# Patient Record
Sex: FEMALE | Race: WHITE | Employment: OTHER | URBAN - METROPOLITAN AREA
[De-identification: names, ages, dates, MRNs, and addresses within clinical notes are randomized per-mention and may not be internally consistent; named-entity substitution may affect disease eponyms.]

---

## 2018-08-07 ENCOUNTER — OFFICE VISIT (OUTPATIENT)
Dept: INTERNAL MEDICINE CLINIC | Age: 68
End: 2018-08-07

## 2018-08-07 VITALS
HEIGHT: 65 IN | WEIGHT: 161 LBS | DIASTOLIC BLOOD PRESSURE: 78 MMHG | BODY MASS INDEX: 26.82 KG/M2 | SYSTOLIC BLOOD PRESSURE: 132 MMHG | HEART RATE: 76 BPM

## 2018-08-07 DIAGNOSIS — Z78.0 POSTMENOPAUSAL: ICD-10-CM

## 2018-08-07 DIAGNOSIS — M54.2 CHRONIC NECK PAIN: ICD-10-CM

## 2018-08-07 DIAGNOSIS — F32.5 MAJOR DEPRESSIVE DISORDER WITH SINGLE EPISODE, IN FULL REMISSION (HCC): ICD-10-CM

## 2018-08-07 DIAGNOSIS — Z80.3 FH: BREAST CANCER IN FIRST DEGREE RELATIVE: ICD-10-CM

## 2018-08-07 DIAGNOSIS — G89.29 CHRONIC NECK PAIN: ICD-10-CM

## 2018-08-07 DIAGNOSIS — Z12.11 COLON CANCER SCREENING: ICD-10-CM

## 2018-08-07 DIAGNOSIS — I10 ESSENTIAL HYPERTENSION: Primary | ICD-10-CM

## 2018-08-07 PROCEDURE — 99204 OFFICE O/P NEW MOD 45 MIN: CPT | Performed by: INTERNAL MEDICINE

## 2018-08-07 RX ORDER — OMEGA-3 FATTY ACIDS CAP DELAYED RELEASE 1000 MG 1000 MG
2000 CAPSULE DELAYED RELEASE ORAL
COMMUNITY

## 2018-08-07 RX ORDER — ACETAMINOPHEN 160 MG
TABLET,DISINTEGRATING ORAL
COMMUNITY

## 2018-08-07 RX ORDER — LISINOPRIL AND HYDROCHLOROTHIAZIDE 12.5; 1 MG/1; MG/1
1 TABLET ORAL DAILY
COMMUNITY
End: 2018-10-01 | Stop reason: SDUPTHER

## 2018-08-07 RX ORDER — VITAMIN E 268 MG
400 CAPSULE ORAL DAILY
COMMUNITY

## 2018-08-07 RX ORDER — CITALOPRAM 20 MG/1
30 TABLET ORAL DAILY
COMMUNITY
End: 2018-10-01 | Stop reason: SDUPTHER

## 2018-08-07 RX ORDER — UBIDECARENONE 60 MG
CAPSULE ORAL
COMMUNITY

## 2018-08-07 RX ORDER — POTASSIUM CHLORIDE 1.5 G/1.77G
20 POWDER, FOR SOLUTION ORAL DAILY
COMMUNITY

## 2018-08-07 ASSESSMENT — PATIENT HEALTH QUESTIONNAIRE - PHQ9
SUM OF ALL RESPONSES TO PHQ9 QUESTIONS 1 & 2: 0
2. FEELING DOWN, DEPRESSED OR HOPELESS: 0
1. LITTLE INTEREST OR PLEASURE IN DOING THINGS: 0
SUM OF ALL RESPONSES TO PHQ QUESTIONS 1-9: 0

## 2018-08-07 NOTE — PROGRESS NOTES
lisinopril-hydrochlorothiazide (PRINZIDE;ZESTORETIC) 10-12.5 MG per tablet Take 1 tablet by mouth daily      citalopram (CELEXA) 20 MG tablet Take 30 mg by mouth daily      aspirin 81 MG tablet Take 81 mg by mouth daily      Omega-3 Fatty Acids (FISH OIL) 1000 MG CPDR Take 2,000 mg by mouth      vitamin E 400 UNIT capsule Take 400 Units by mouth daily      Cholecalciferol (VITAMIN D3) 2000 units CAPS Take by mouth      calcium acetate (PHOSLO) 667 MG capsule Take by mouth 3 times daily (with meals)      Coenzyme Q10 (CO Q 10) 60 MG CAPS Take by mouth      potassium chloride (KLOR-CON) 20 MEQ packet Take 20 mEq by mouth 2 times daily         Past Medical History:   Diagnosis Date    Hypertension     Major depression      Past Surgical History:   Procedure Laterality Date     SECTION       Family History   Problem Relation Age of Onset    Breast Cancer Mother     Arrhythmia Mother         Pacemaker    Heart Failure Father     Breast Cancer Sister      Social History   Substance Use Topics    Smoking status: Never Smoker    Smokeless tobacco: Never Used    Alcohol use 2.4 oz/week     4 Glasses of wine per week     History   Sexual Activity    Sexual activity: Not on file          HPI  Hypertension:  Home blood pressure monitoring: Yes - 120-130s/80s. She is adherent to a low sodium diet. Patient denies chest pain, shortness of breath, lightheadedness and palpitations. Antihypertensive medication side effects: no medication side effects noted. Use of agents associated with hypertension: none. Mood Disorder:  Patient presents for follow-up of depression. Current complaints include: none. She denies anhedonia, depressed mood, tearfulness, feelings of hopelessness, feelings of worthlessness/excessive guilt, insomnia, irritability, excessive worry and suicidal thoughts or behavior. Symptoms/signs of cj: none. External stressors: nothing new.

## 2018-09-29 ENCOUNTER — TELEPHONE (OUTPATIENT)
Dept: INTERNAL MEDICINE CLINIC | Age: 68
End: 2018-09-29

## 2018-10-01 ENCOUNTER — TELEPHONE (OUTPATIENT)
Dept: INTERNAL MEDICINE CLINIC | Age: 68
End: 2018-10-01

## 2018-10-01 RX ORDER — CITALOPRAM 20 MG/1
30 TABLET ORAL DAILY
Qty: 30 TABLET | Refills: 5 | Status: SHIPPED | OUTPATIENT
Start: 2018-10-01 | End: 2018-10-29 | Stop reason: SDUPTHER

## 2018-10-01 RX ORDER — LISINOPRIL AND HYDROCHLOROTHIAZIDE 12.5; 1 MG/1; MG/1
1 TABLET ORAL DAILY
Qty: 30 TABLET | Refills: 5 | Status: SHIPPED | OUTPATIENT
Start: 2018-10-01 | End: 2018-10-29 | Stop reason: SDUPTHER

## 2018-10-06 LAB — FECAL BLOOD IMMUNOCHEMICAL TEST: NEGATIVE

## 2018-10-09 DIAGNOSIS — I10 ESSENTIAL HYPERTENSION: ICD-10-CM

## 2018-10-09 LAB
A/G RATIO: 2 (ref 1.1–2.2)
ALBUMIN SERPL-MCNC: 4.4 G/DL (ref 3.4–5)
ALP BLD-CCNC: 60 U/L (ref 40–129)
ALT SERPL-CCNC: 17 U/L (ref 10–40)
ANION GAP SERPL CALCULATED.3IONS-SCNC: 16 MMOL/L (ref 3–16)
AST SERPL-CCNC: 20 U/L (ref 15–37)
BILIRUB SERPL-MCNC: <0.2 MG/DL (ref 0–1)
BUN BLDV-MCNC: 20 MG/DL (ref 7–20)
CALCIUM SERPL-MCNC: 9.6 MG/DL (ref 8.3–10.6)
CHLORIDE BLD-SCNC: 99 MMOL/L (ref 99–110)
CHOLESTEROL, FASTING: 187 MG/DL (ref 0–199)
CO2: 25 MMOL/L (ref 21–32)
CREAT SERPL-MCNC: 0.6 MG/DL (ref 0.6–1.2)
GFR AFRICAN AMERICAN: >60
GFR NON-AFRICAN AMERICAN: >60
GLOBULIN: 2.2 G/DL
GLUCOSE FASTING: 99 MG/DL (ref 70–99)
HDLC SERPL-MCNC: 68 MG/DL (ref 40–60)
LDL CHOLESTEROL CALCULATED: 93 MG/DL
POTASSIUM SERPL-SCNC: 4.5 MMOL/L (ref 3.5–5.1)
SODIUM BLD-SCNC: 140 MMOL/L (ref 136–145)
TOTAL PROTEIN: 6.6 G/DL (ref 6.4–8.2)
TRIGLYCERIDE, FASTING: 131 MG/DL (ref 0–150)
TSH REFLEX: 2.01 UIU/ML (ref 0.27–4.2)
VLDLC SERPL CALC-MCNC: 26 MG/DL

## 2018-10-29 ENCOUNTER — TELEPHONE (OUTPATIENT)
Dept: INTERNAL MEDICINE CLINIC | Age: 68
End: 2018-10-29

## 2018-10-29 RX ORDER — LISINOPRIL AND HYDROCHLOROTHIAZIDE 12.5; 1 MG/1; MG/1
1 TABLET ORAL DAILY
Qty: 90 TABLET | Refills: 1 | Status: SHIPPED | OUTPATIENT
Start: 2018-10-29 | End: 2019-06-06 | Stop reason: SDUPTHER

## 2018-10-29 RX ORDER — CITALOPRAM 20 MG/1
30 TABLET ORAL DAILY
Qty: 135 TABLET | Refills: 1 | Status: SHIPPED | OUTPATIENT
Start: 2018-10-29 | End: 2019-05-06 | Stop reason: SDUPTHER

## 2018-12-10 ENCOUNTER — TELEPHONE (OUTPATIENT)
Dept: INTERNAL MEDICINE CLINIC | Age: 68
End: 2018-12-10

## 2018-12-10 NOTE — TELEPHONE ENCOUNTER
You can provide her with an external derm referral since our the wait time for our dermatologists would be too long. Dermatologists:    Yareli Long.  MD Sri Antoine)  [address] [place] [address], [address] [place] [address]    Dr Eddy Call           [phone]   [facility]    Dr Jeffrey Samano        [phone]  [facility]

## 2019-05-06 ENCOUNTER — TELEPHONE (OUTPATIENT)
Dept: INTERNAL MEDICINE CLINIC | Age: 69
End: 2019-05-06

## 2019-05-06 RX ORDER — CITALOPRAM 20 MG/1
30 TABLET ORAL DAILY
Qty: 135 TABLET | Refills: 0 | Status: SHIPPED | OUTPATIENT
Start: 2019-05-06 | End: 2019-06-06 | Stop reason: SDUPTHER

## 2019-05-06 NOTE — TELEPHONE ENCOUNTER
Patient is requesting the following prescription(s):  citalopram (CELEXA) 20 MG tablet Take 1.5 tablets by mouth daily     [address] [facility] [phone]     Aware doctor  is out of the office today    Patient made aware to allow 24 to 48 business hours for prescription refills. Patient states she only has enough to last through tomorrow.

## 2019-06-06 ENCOUNTER — OFFICE VISIT (OUTPATIENT)
Dept: INTERNAL MEDICINE CLINIC | Age: 69
End: 2019-06-06
Payer: MEDICARE

## 2019-06-06 VITALS
WEIGHT: 166 LBS | SYSTOLIC BLOOD PRESSURE: 130 MMHG | HEART RATE: 74 BPM | OXYGEN SATURATION: 98 % | DIASTOLIC BLOOD PRESSURE: 78 MMHG | BODY MASS INDEX: 27.62 KG/M2

## 2019-06-06 DIAGNOSIS — Z12.31 ENCOUNTER FOR SCREENING MAMMOGRAM FOR BREAST CANCER: ICD-10-CM

## 2019-06-06 DIAGNOSIS — G89.29 CHRONIC NECK PAIN: ICD-10-CM

## 2019-06-06 DIAGNOSIS — I10 ESSENTIAL HYPERTENSION: Primary | ICD-10-CM

## 2019-06-06 DIAGNOSIS — Z78.0 POST-MENOPAUSAL: ICD-10-CM

## 2019-06-06 DIAGNOSIS — F32.5 MAJOR DEPRESSIVE DISORDER WITH SINGLE EPISODE, IN FULL REMISSION (HCC): ICD-10-CM

## 2019-06-06 DIAGNOSIS — M54.2 CHRONIC NECK PAIN: ICD-10-CM

## 2019-06-06 DIAGNOSIS — Z12.11 SCREENING FOR COLON CANCER: Primary | ICD-10-CM

## 2019-06-06 PROCEDURE — 90471 IMMUNIZATION ADMIN: CPT | Performed by: INTERNAL MEDICINE

## 2019-06-06 PROCEDURE — 99214 OFFICE O/P EST MOD 30 MIN: CPT | Performed by: INTERNAL MEDICINE

## 2019-06-06 PROCEDURE — 90715 TDAP VACCINE 7 YRS/> IM: CPT | Performed by: INTERNAL MEDICINE

## 2019-06-06 RX ORDER — CITALOPRAM 20 MG/1
30 TABLET ORAL DAILY
Qty: 135 TABLET | Refills: 1 | Status: SHIPPED | OUTPATIENT
Start: 2019-06-06 | End: 2019-12-12 | Stop reason: SDUPTHER

## 2019-06-06 RX ORDER — LISINOPRIL AND HYDROCHLOROTHIAZIDE 12.5; 1 MG/1; MG/1
1 TABLET ORAL DAILY
Qty: 90 TABLET | Refills: 1 | Status: SHIPPED | OUTPATIENT
Start: 2019-06-06 | End: 2019-12-12 | Stop reason: SDUPTHER
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
## 2019-06-07 ENCOUNTER — TELEPHONE (OUTPATIENT)
Dept: INTERNAL MEDICINE CLINIC | Age: 69
End: 2019-06-07

## 2019-06-07 NOTE — TELEPHONE ENCOUNTER
Upon leaving from her appt yesterday patient saw on her AVS in top right under things discussed Major Depression Disorder/Single Episode. She does not know what constituted this except she talked about breaking up with long-term boyfriend but she said really not that bad. She would like that removed. Is that possible? Please call patient at number provided to let her know. Thanks.

## 2019-06-12 ENCOUNTER — HOSPITAL ENCOUNTER (OUTPATIENT)
Dept: MAMMOGRAPHY | Age: 69
Discharge: HOME | End: 2019-06-12
Payer: MEDICARE

## 2019-06-12 ENCOUNTER — HOSPITAL ENCOUNTER (OUTPATIENT)
Dept: ULTRASOUND IMAGING | Age: 69
Discharge: HOME | End: 2019-06-12
Payer: MEDICARE

## 2019-06-12 ENCOUNTER — HOSPITAL ENCOUNTER (OUTPATIENT)
Dept: GENERAL RADIOLOGY | Age: 69
Discharge: HOME | End: 2019-06-12
Payer: MEDICARE

## 2019-06-12 DIAGNOSIS — Z78.0 POST-MENOPAUSAL: ICD-10-CM

## 2019-06-12 DIAGNOSIS — Z78.0 POSTMENOPAUSAL: ICD-10-CM

## 2019-06-12 DIAGNOSIS — Z12.31 ENCOUNTER FOR SCREENING MAMMOGRAM FOR BREAST CANCER: ICD-10-CM

## 2019-06-12 DIAGNOSIS — R92.8 ABNORMAL MAMMOGRAM: ICD-10-CM

## 2019-06-12 PROBLEM — M85.89 OSTEOPENIA OF MULTIPLE SITES: Status: ACTIVE | Noted: 2019-06-12

## 2019-06-12 PROCEDURE — 76642 ULTRASOUND BREAST LIMITED: CPT

## 2019-06-12 PROCEDURE — 77067 SCR MAMMO BI INCL CAD: CPT

## 2019-06-12 PROCEDURE — 77080 DXA BONE DENSITY AXIAL: CPT

## 2019-06-12 PROCEDURE — G0279 TOMOSYNTHESIS, MAMMO: HCPCS

## 2019-06-21 ENCOUNTER — HOSPITAL ENCOUNTER (OUTPATIENT)
Dept: ULTRASOUND IMAGING | Age: 69
Discharge: HOME | End: 2019-06-21
Payer: MEDICARE

## 2019-06-21 ENCOUNTER — HOSPITAL ENCOUNTER (OUTPATIENT)
Dept: MAMMOGRAPHY | Age: 69
Discharge: HOME | End: 2019-06-21
Payer: MEDICARE

## 2019-06-21 DIAGNOSIS — R92.8 ABNORMAL MAMMOGRAM: ICD-10-CM

## 2019-06-21 DIAGNOSIS — R93.89 ABNORMAL ULTRASOUND: ICD-10-CM

## 2019-06-21 PROCEDURE — 77065 DX MAMMO INCL CAD UNI: CPT

## 2019-06-21 PROCEDURE — 88305 TISSUE EXAM BY PATHOLOGIST: CPT

## 2019-06-21 PROCEDURE — 2709999900 US BREAST BIOPSY W LOC DEVICE 1ST LESION LEFT

## 2019-12-12 ENCOUNTER — OFFICE VISIT (OUTPATIENT)
Dept: INTERNAL MEDICINE CLINIC | Age: 69
End: 2019-12-12
Payer: MEDICARE

## 2019-12-12 VITALS
SYSTOLIC BLOOD PRESSURE: 128 MMHG | DIASTOLIC BLOOD PRESSURE: 80 MMHG | OXYGEN SATURATION: 97 % | HEIGHT: 65 IN | BODY MASS INDEX: 28.66 KG/M2 | HEART RATE: 70 BPM | WEIGHT: 172 LBS

## 2019-12-12 DIAGNOSIS — G89.29 CHRONIC NECK PAIN: ICD-10-CM

## 2019-12-12 DIAGNOSIS — M54.2 CHRONIC NECK PAIN: ICD-10-CM

## 2019-12-12 DIAGNOSIS — F32.5 MAJOR DEPRESSIVE DISORDER WITH SINGLE EPISODE, IN FULL REMISSION (HCC): ICD-10-CM

## 2019-12-12 DIAGNOSIS — I10 ESSENTIAL HYPERTENSION: Primary | ICD-10-CM

## 2019-12-12 DIAGNOSIS — M85.89 OSTEOPENIA OF MULTIPLE SITES: ICD-10-CM

## 2019-12-12 PROCEDURE — 99214 OFFICE O/P EST MOD 30 MIN: CPT | Performed by: INTERNAL MEDICINE

## 2019-12-12 RX ORDER — LISINOPRIL AND HYDROCHLOROTHIAZIDE 12.5; 1 MG/1; MG/1
1 TABLET ORAL DAILY
Qty: 90 TABLET | Refills: 1 | Status: SHIPPED | OUTPATIENT
Start: 2019-12-12 | End: 2020-06-19

## 2019-12-12 RX ORDER — CITALOPRAM 20 MG/1
30 TABLET ORAL DAILY
Qty: 135 TABLET | Refills: 1 | Status: SHIPPED | OUTPATIENT
Start: 2019-12-12 | End: 2020-06-19

## 2020-06-02 ENCOUNTER — TELEPHONE (OUTPATIENT)
Dept: INTERNAL MEDICINE CLINIC | Age: 70
End: 2020-06-02

## 2020-06-19 RX ORDER — CITALOPRAM 20 MG/1
TABLET ORAL
Qty: 135 TABLET | Refills: 0 | Status: SHIPPED | OUTPATIENT
Start: 2020-06-19 | End: 2020-09-04 | Stop reason: SDUPTHER

## 2020-06-19 RX ORDER — LISINOPRIL AND HYDROCHLOROTHIAZIDE 12.5; 1 MG/1; MG/1
TABLET ORAL
Qty: 90 TABLET | Refills: 0 | Status: SHIPPED | OUTPATIENT
Start: 2020-06-19 | End: 2020-09-04 | Stop reason: SDUPTHER

## 2020-07-14 ENCOUNTER — TELEPHONE (OUTPATIENT)
Dept: INTERNAL MEDICINE CLINIC | Age: 70
End: 2020-07-14

## 2020-09-04 ENCOUNTER — OFFICE VISIT (OUTPATIENT)
Dept: INTERNAL MEDICINE CLINIC | Age: 70
End: 2020-09-04
Payer: MEDICARE

## 2020-09-04 VITALS
TEMPERATURE: 96.9 F | WEIGHT: 173 LBS | DIASTOLIC BLOOD PRESSURE: 68 MMHG | HEART RATE: 61 BPM | OXYGEN SATURATION: 98 % | SYSTOLIC BLOOD PRESSURE: 114 MMHG | BODY MASS INDEX: 28.57 KG/M2

## 2020-09-04 PROCEDURE — 99214 OFFICE O/P EST MOD 30 MIN: CPT | Performed by: INTERNAL MEDICINE

## 2020-09-04 RX ORDER — CITALOPRAM 20 MG/1
TABLET ORAL
Qty: 135 TABLET | Refills: 1 | Status: SHIPPED | OUTPATIENT
Start: 2020-09-04 | End: 2021-03-16 | Stop reason: SDUPTHER

## 2020-09-04 RX ORDER — LISINOPRIL AND HYDROCHLOROTHIAZIDE 12.5; 1 MG/1; MG/1
TABLET ORAL
Qty: 90 TABLET | Refills: 1 | Status: SHIPPED | OUTPATIENT
Start: 2020-09-04 | End: 2021-03-16 | Stop reason: SDUPTHER

## 2020-09-04 NOTE — PROGRESS NOTES
Sig Taking? Authorizing Provider   lisinopril-hydroCHLOROthiazide (PRINZIDE;ZESTORETIC) 10-12.5 MG per tablet TAKE ONE TABLET BY MOUTH DAILY Yes Alee Amin MD   citalopram (CELEXA) 20 MG tablet TAKE ONE AND ONE-HALF TABLET BY MOUTH DAILY Yes Alee Amin MD   BIOTIN PO Take by mouth Yes Historical Provider, MD   aspirin 81 MG tablet Take 81 mg by mouth daily Yes Historical Provider, MD   Omega-3 Fatty Acids (FISH OIL) 1000 MG CPDR Take 2,000 mg by mouth Yes Historical Provider, MD   vitamin E 400 UNIT capsule Take 400 Units by mouth daily Yes Historical Provider, MD   Cholecalciferol (VITAMIN D3) 2000 units CAPS Take by mouth Yes Historical Provider, MD   Coenzyme Q10 (CO Q 10) 60 MG CAPS Take by mouth Yes Historical Provider, MD   potassium chloride (KLOR-CON) 20 MEQ packet Take 20 mEq by mouth daily  Yes Historical Provider, MD   calcium acetate (PHOSLO) 667 MG capsule Take by mouth 3 times daily (with meals)  Historical Provider, MD       Vitals:    09/04/20 0936   BP: 114/68   Pulse: 61   Temp: 96.9 °F (36.1 °C)   TempSrc: Infrared   SpO2: 98%   Weight: 173 lb (78.5 kg)      Body mass index is 28.57 kg/m². Wt Readings from Last 3 Encounters:   09/04/20 173 lb (78.5 kg)   12/12/19 172 lb (78 kg)   06/06/19 166 lb (75.3 kg)     BP Readings from Last 3 Encounters:   09/04/20 114/68   12/12/19 128/80   06/06/19 130/78       Physical Exam  Constitutional:       General: She is not in acute distress. Appearance: She is well-developed. Eyes:      Conjunctiva/sclera: Conjunctivae normal.   Neck:      Musculoskeletal: No spinous process tenderness or muscular tenderness. Thyroid: No thyroid mass or thyromegaly. Cardiovascular:      Rate and Rhythm: Normal rate and regular rhythm. Heart sounds: Normal heart sounds. No murmur. No friction rub. No gallop. Pulmonary:      Effort: Pulmonary effort is normal. No respiratory distress. Breath sounds: Normal breath sounds.  No wheezing, rhonchi or rales. Lymphadenopathy:      Cervical: No cervical adenopathy. Skin:     General: Skin is warm and dry. Findings: No erythema or rash. Neurological:      Mental Status: She is alert and oriented to person, place, and time. Psychiatric:         Speech: Speech normal.         Behavior: Behavior normal.         Thought Content:  Thought content normal.         Judgment: Judgment normal.         Lab Results   Component Value Date    CHOLFAST 187 10/09/2018    TRIGLYCFAST 131 10/09/2018    HDL 68 (H) 10/09/2018    LDLCALC 93 10/09/2018     Lab Results   Component Value Date    GLUF 97 12/12/2019     Lab Results   Component Value Date     12/12/2019    K 4.1 12/12/2019    BUN 16 12/12/2019    CREATININE 0.5 (L) 12/12/2019    LABGLOM >60 12/12/2019    GFRAA >60 12/12/2019    CALCIUM 9.6 12/12/2019    VITD25 38.0 12/12/2019     Lab Results   Component Value Date    ALT 19 12/12/2019    AST 21 12/12/2019     No results found for: HGB  Lab Results   Component Value Date    TSHREFLEX 2.01 10/09/2018

## 2020-10-20 ENCOUNTER — TELEPHONE (OUTPATIENT)
Dept: INTERNAL MEDICINE CLINIC | Age: 70
End: 2020-10-20

## 2021-03-16 ENCOUNTER — VIRTUAL VISIT (OUTPATIENT)
Dept: INTERNAL MEDICINE CLINIC | Age: 71
End: 2021-03-16
Payer: MEDICARE

## 2021-03-16 DIAGNOSIS — H91.90 HEARING LOSS, UNSPECIFIED HEARING LOSS TYPE, UNSPECIFIED LATERALITY: ICD-10-CM

## 2021-03-16 DIAGNOSIS — Z00.00 ROUTINE GENERAL MEDICAL EXAMINATION AT A HEALTH CARE FACILITY: Primary | ICD-10-CM

## 2021-03-16 DIAGNOSIS — Z11.59 NEED FOR HEPATITIS C SCREENING TEST: ICD-10-CM

## 2021-03-16 PROCEDURE — G0438 PPPS, INITIAL VISIT: HCPCS | Performed by: INTERNAL MEDICINE

## 2021-03-16 RX ORDER — CITALOPRAM 20 MG/1
TABLET ORAL
Qty: 135 TABLET | Refills: 1 | Status: SHIPPED | OUTPATIENT
Start: 2021-03-16 | End: 2021-09-28 | Stop reason: SDUPTHER

## 2021-03-16 RX ORDER — LISINOPRIL AND HYDROCHLOROTHIAZIDE 12.5; 1 MG/1; MG/1
TABLET ORAL
Qty: 90 TABLET | Refills: 1 | Status: SHIPPED | OUTPATIENT
Start: 2021-03-16 | End: 2021-09-28 | Stop reason: SDUPTHER

## 2021-03-16 ASSESSMENT — PATIENT HEALTH QUESTIONNAIRE - PHQ9
2. FEELING DOWN, DEPRESSED OR HOPELESS: 1
SUM OF ALL RESPONSES TO PHQ9 QUESTIONS 1 & 2: 2

## 2021-03-16 ASSESSMENT — LIFESTYLE VARIABLES
HAVE YOU OR SOMEONE ELSE BEEN INJURED AS A RESULT OF YOUR DRINKING: 0
HAS A RELATIVE, FRIEND, DOCTOR, OR ANOTHER HEALTH PROFESSIONAL EXPRESSED CONCERN ABOUT YOUR DRINKING OR SUGGESTED YOU CUT DOWN: 0
AUDIT-C TOTAL SCORE: 3
HOW OFTEN DURING THE LAST YEAR HAVE YOU BEEN UNABLE TO REMEMBER WHAT HAPPENED THE NIGHT BEFORE BECAUSE YOU HAD BEEN DRINKING: 0
HOW OFTEN DURING THE LAST YEAR HAVE YOU HAD A FEELING OF GUILT OR REMORSE AFTER DRINKING: 0
HOW OFTEN DURING THE LAST YEAR HAVE YOU FOUND THAT YOU WERE NOT ABLE TO STOP DRINKING ONCE YOU HAD STARTED: 0

## 2021-03-16 NOTE — PROGRESS NOTES
Cancer Mother     Arrhythmia Mother         Pacemaker    Heart Failure Father     Breast Cancer Sister        CareTeam (Including outside providers/suppliers regularly involved in providing care):   Patient Care Team:  Eden Enriquez MD as PCP - General (Internal Medicine)  Eden Enriquez MD as PCP - REHABILITATION [facility] Empaneled Provider    Wt Readings from Last 3 Encounters:   09/04/20 173 lb (78.5 kg)   12/12/19 172 lb (78 kg)   06/06/19 166 lb (75.3 kg)     No flowsheet data found. There is no height or weight on file to calculate BMI. Based upon direct observation of the patient, evaluation of cognition reveals recent and remote memory intact. Physical Exam  Constitutional:       General: She is not in acute distress. Appearance: She is well-developed. HENT:      Head: Normocephalic and atraumatic. Mouth/Throat:      Lips: No lesions. Neck:      Comments: No visible mass  Pulmonary:      Effort: Pulmonary effort is normal. No respiratory distress. Skin:     Comments: No rash, erythema or other discoloration on facial skin and exposed areas of neck and upper extremites    Neurological:      Mental Status: She is alert. Comments: No facial asymmetry (cranial nerve 7 motor function) or gaze palsy   Psychiatric:         Attention and Perception: Attention normal.         Mood and Affect: Mood normal.         Speech: Speech normal.         Behavior: Behavior normal.         Thought Content: Thought content normal.         Cognition and Memory: Cognition normal.       Patient's complete Health Risk Assessment and screening values have been reviewed and are found in Flowsheets. The following problems were reviewed today and where indicated follow up appointments were made and/or referrals ordered.     Positive Risk Factor Screenings with Interventions:            General Health and ACP:  General  In general, how would you say your health is?: Very Good  In the past 7 days, have you experienced any of the following?  New or Increased Pain, New or Increased Fatigue, Loneliness, Social Isolation, Stress or Anger?: None of These  Do you get the social and emotional support that you need?: Yes  Do you have a Living Will?: (!) No(Mailing to patient)  Advance Directives     Power of [address] Will ACP-Advance Directive ACP-Power of     Not on File Not on File Not on File Not on [address] [facility] [address] Interventions:  · No Living Will: Patient referred to ACP Clinical Specialist     Hearing/Vision:  No exam data present  Hearing/Vision  Do you or your family notice any trouble with your hearing that hasn't been managed with hearing aids?: (!) Yes  Do you have difficulty driving, watching TV, or doing any of your daily activities because of your eyesight?: No  Have you had an eye exam within the past year?: (!) No  Hearing/Vision Interventions:  · Hearing concerns:  audiology referral provided  · Vision concerns:  patient encouraged to make appointment with his/her eye specialist    Safety:  Safety  Do you have working smoke detectors?: Yes  Have all throw rugs been removed or fastened?: (!) No  Do you have non-slip mats or surfaces in all bathtubs/showers?: (!) No  Do all of your stairways have a railing or banister?: Yes  Are your doorways, halls and stairs free of clutter?: Yes  Do you always fasten your seatbelt when you are in a car?: Yes  Safety Interventions:  · Home safety tips provided     Personalized Preventive Plan   Current Health Maintenance Status  Immunization History   Administered Date(s) Administered    COVID-19, Goodman Peter, PF, 30mcg/0.3mL 02/11/2021, 03/02/2021    Tdap (Boostrix, Adacel) 06/06/2019        Health Maintenance   Topic Date Due    Hepatitis C screen  Never done    Shingles Vaccine (1 of 2) Never done    Pneumococcal 65+ years Vaccine (1 of 1 - PPSV23) Never done   [employer] Visit (AWV)  Never done    Colon Cancer Screen FIT/FOBT  10/06/2019    Breast cancer screen  06/21/2020    Flu vaccine (1) Never done    Potassium monitoring  12/12/2020    Creatinine monitoring  12/12/2020    Lipid screen  10/09/2023    DEXA (modify frequency per FRAX score)  06/12/2024    DTaP/Tdap/Td vaccine (2 - Td) 06/06/2029    COVID-19 Vaccine  Completed    Hepatitis A vaccine  Aged Out    Hepatitis B vaccine  Aged Out    Hib vaccine  Aged Out    Meningococcal (ACWY) vaccine  Aged Out     Recommendations for [employer] Due: see orders and patient instructions/AVS.  . Recommended screening schedule for the next 5-10 years is provided to the patient in written form: see Patient Instructions/AVS.    Cheo Reyna was seen today for medicare awv.    1. Routine general medical examination at a health care facility  -     [facility] Referral to ACP Clinical Specialist  -     She will consider obtaining Shingrix and pneumococcal vaccinations, but not currently inclined to do this  -     Will mail FIT test to patient's home  -     She will schedule mammogram  -     Flu shot recommended in the fall  2. Need for hepatitis C screening test  -     Hepatitis C Antibody; Future  3. Hearing loss, unspecified hearing loss type, unspecified laterality  -     [address], [place] D., Audiology, Ariana Sigala    Return in about 6 months (around 9/16/2021). Andrea Benson is a 69 y.o. female being evaluated by a Virtual Visit (video and audio) encounter to address concerns as mentioned above. A caregiver was present when appropriate. Due to this being a TeleHealth encounter (During GDDQG-00 public health emergency), evaluation of the following organ systems was limited: Vitals/Constitutional/EENT/Resp/CV/GI//MS/Neuro/Skin/Heme-Lymph-Imm.   Pursuant to the emergency declaration under the [facility] [facility] waiver authority and the [employer] Act, this Virtual Visit was conducted with patient's (and/or legal guardian's) consent, to reduce the patient's risk of exposure to COVID-19 and provide necessary medical care. The patient (and/or legal guardian) has also been advised to contact this office for worsening conditions or problems, and seek emergency medical treatment and/or call 911 if deemed necessary. Patient identification was verified at the start of the visit: Yes    Services were provided through a video synchronous discussion virtually to substitute for in-person clinic visit. Patient and provider were located at their individual homes. --Qasim Weiner MD on 3/16/2021 at 2:26 PM    An electronic signature was used to authenticate this note.

## 2021-03-16 NOTE — PATIENT INSTRUCTIONS
Personalized Preventive Plan for Brynn Vo - 3/16/2021  Medicare offers a range of preventive health benefits. Some of the tests and screenings are paid in full while other may be subject to a deductible, co-insurance, and/or copay. Some of these benefits include a comprehensive review of your medical history including lifestyle, illnesses that may run in your family, and various assessments and screenings as appropriate. After reviewing your medical record and screening and assessments performed today your provider may have ordered immunizations, labs, imaging, and/or referrals for you. A list of these orders (if applicable) as well as your Preventive Care list are included within your After Visit Summary for your review. Other Preventive Recommendations:    · A preventive eye exam performed by an eye specialist is recommended every 1-2 years to screen for glaucoma; cataracts, macular degeneration, and other eye disorders. · A preventive dental visit is recommended every 6 months. · Try to get at least 150 minutes of exercise per week or 10,000 steps per day on a pedometer . · Order or download the FREE \"Exercise & Physical Activity: Your Everyday Guide\" from [employer]. Call 5-521.708.4169 or search [employer] online. · You need 7697-0876 mg of calcium and 7861-2720 IU of vitamin D per day. It is possible to meet your calcium requirement with diet alone, but a vitamin D supplement is usually necessary to meet this goal.  · When exposed to the sun, use a sunscreen that protects against both UVA and UVB radiation with an SPF of 30 or greater. Reapply every 2 to 3 hours or after sweating, drying off with a towel, or swimming. · Always wear a seat belt when traveling in a car. Always wear a helmet when riding a bicycle or motorcycle.

## 2021-03-17 ENCOUNTER — CLINICAL DOCUMENTATION (OUTPATIENT)
Dept: SPIRITUAL SERVICES | Age: 71
End: 2021-03-17

## 2021-03-17 NOTE — PROGRESS NOTES
Outpatient Spiritual Care Services (SCS) team member attempted telephone call to patient to discuss advance care plans. There was no answer and voice message was left encouraging patient to contact Outpatient SCS. Contact information for Outpatient SCS provided. We'll attempt to call her again next week.

## 2021-03-17 NOTE — FLOWSHEET NOTE
03/17/21 1518   Encounter Summary   Services provided to: Patient not available   Continue Visiting Yes  (3/17 Attenmpted ACP call. No answer. Lft msg.)   Complexity of Encounter Low   Length of Encounter 15 minutes   Advance Directives (For Healthcare)   Healthcare Directive No, patient does not have an advance directive for healthcare treatment   Advance Directives Unable to complete  (Left voicemail.)   Outpatient Spiritual Care Services (SCS) team member attempted telephone call to patient to discuss advance care plans. There was no answer and voice message was left encouraging patient to contact Outpatient SCS. Contact information for Outpatient SCS provided. We'll attempt to call her again next week.

## 2021-03-31 ENCOUNTER — CLINICAL DOCUMENTATION (OUTPATIENT)
Dept: SPIRITUAL SERVICES | Age: 71
End: 2021-03-31

## 2021-03-31 NOTE — ACP (ADVANCE CARE PLANNING)
3/31    An outreach call was made to the patient by ACP Specialist and a message was left for the patient to ask to return call to writer. ACP Specialist will follow up with patient one last time in 2 weeks.     Kendell Mccain

## 2021-04-06 NOTE — TELEPHONE ENCOUNTER
Done and given information about my chart [Dear  ___] : Dear  [unfilled], [Consult Letter:] : I had the pleasure of evaluating your patient, [unfilled]. [Consult Closing:] : Thank you very much for allowing me to participate in the care of this patient.  If you have any questions, please do not hesitate to contact me. [Please see my note below.] : Please see my note below. [Sincerely,] : Sincerely, [FreeTextEntry2] : Dr John Cherry. [FreeTextEntry3] : \par Mainor Meek MD, FACS\par \par Otolaryngology-Head and Neck Surgery\par [employer] [facility]\par

## 2021-04-13 ENCOUNTER — CLINICAL DOCUMENTATION (OUTPATIENT)
Dept: SPIRITUAL SERVICES | Age: 71
End: 2021-04-13

## 2021-04-13 NOTE — ACP (ADVANCE CARE PLANNING)
restart your heart (answer \"yes\" for attempt to resuscitate) or would you prefer a natural death (answer \"no\" for do not attempt to resuscitate)? \" no       Length of ACP Conversation in minutes:  25    Conversation Outcomes:  [x] ACP discussion completed  [] Existing advance directive reviewed with patient; no changes to patient's previously recorded wishes  [] New Advance Directive completed  [] Portable Do Not Rescitate prepared for Provider review and signature  [] POLST/POST/MOLST/MOST prepared for Provider review and signature      Follow-up plan:    [x] Schedule follow-up conversation to continue planning  [] Referred individual to Provider for additional questions/concerns   [] Advised patient/agent/surrogate to review completed ACP document and update if needed with changes in condition, patient preferences or care setting    [x] This note routed to one or more involved healthcare providers

## 2021-04-16 ENCOUNTER — NURSE ONLY (OUTPATIENT)
Dept: INTERNAL MEDICINE CLINIC | Age: 71
End: 2021-04-16
Payer: MEDICARE

## 2021-04-16 DIAGNOSIS — Z12.11 COLON CANCER SCREENING: Primary | ICD-10-CM

## 2021-04-16 LAB
CONTROL: NORMAL
HEMOCCULT STL QL: NORMAL

## 2021-04-16 PROCEDURE — 82274 ASSAY TEST FOR BLOOD FECAL: CPT | Performed by: INTERNAL MEDICINE

## 2021-04-18 PROBLEM — R73.9 HYPERGLYCEMIA: Status: ACTIVE | Noted: 2021-04-18

## 2021-04-21 ENCOUNTER — HOSPITAL ENCOUNTER (OUTPATIENT)
Dept: MAMMOGRAPHY | Age: 71
Discharge: HOME | End: 2021-04-21
Payer: MEDICARE

## 2021-04-21 VITALS — HEIGHT: 65 IN | WEIGHT: 166 LBS | BODY MASS INDEX: 27.66 KG/M2

## 2021-04-21 DIAGNOSIS — Z12.31 VISIT FOR SCREENING MAMMOGRAM: ICD-10-CM

## 2021-04-21 PROCEDURE — 77063 BREAST TOMOSYNTHESIS BI: CPT

## 2021-04-23 ENCOUNTER — TELEPHONE (OUTPATIENT)
Dept: INTERNAL MEDICINE CLINIC | Age: 71
End: 2021-04-23

## 2021-04-28 ENCOUNTER — CLINICAL DOCUMENTATION (OUTPATIENT)
Dept: SPIRITUAL SERVICES | Age: 71
End: 2021-04-28

## 2021-04-28 NOTE — PROGRESS NOTES
Advance Care Planning    Ambulatory ACP Specialist Patient Outreach    Date:  4/28/2021  ACP Specialist:  Sol Alba    Outreach call to patient in follow-up to ACP Specialist referral from:  [] PCP  [] Provider   [] Ambulatory Care Management [x] Other for Reason:    [] Continued Conversation for ACP decision making / Goals of Care  [] Code Status Discussion  [x] Completion of Adv Directive  [] Completion of Portable DNR order  [] Other (Specify)    Date Referral Received: 3/16/21    Today's Outreach:  [] First   [] Second  [x] Third                               Third outreach made by [x]  phone  [] email []   [employer]     Intervention:  [x] Spoke with Patient  [] Left VM requesting return call      Outcome: This ACP specialist called to confirm an appointment with the patient complete her advanced directives on 4/29/21 at 1100hrs. The patient was very pleasant and appreciative, but said she wasn't ready to complete the forms yet. She still wants to discuss it more with her sister, who she would name as her POA. Also, she wasn't sure about who her second (back-up) would be. I explained she only need to name one POA, and a secondary agent was optional. She will consider theses things and asked if I would call her again next week to set an appointment then, which I will gladly do. The patient also expressed her wishes to donate her body to science (I.e. [employer]). I informed her she needs to fill out a form for that with the [facility]. She acknowledged that and was glad for the call. Next Step:   [x] ACP scheduled conversation  [x] Outreach again in one week               [] Email / Mail ACP Info Sheets  [] Email / Mail Advance Directive            [] Close Referral. Routing closure to referring provider/staff and to ACP Specialist .      Thank you for this referral.

## 2021-05-07 ENCOUNTER — CLINICAL DOCUMENTATION (OUTPATIENT)
Dept: SPIRITUAL SERVICES | Age: 71
End: 2021-05-07

## 2021-05-07 NOTE — PROGRESS NOTES
Advance Care Planning    Ambulatory ACP Specialist Patient Outreach    Date:  5/7/2021  ACP Specialist:  Magalis Diego    Outreach call to patient in follow-up to ACP Specialist referral from:  [x] PCP  [] Provider   [] Ambulatory Care Management [] Other for Reason:    [] Continued Conversation for ACP decision making / Goals of Care  [] Code Status Discussion  [x] Completion of Adv Directive  [] Completion of Portable DNR order  [] Other (Specify)    Date Referral Received: 3/16/21    Today's Outreach:  [] First   [] Second  [x] Fourth                               Third outreach made by [x]  phone  [] email []   [facility]     Intervention:  [] Spoke with Patient  [x] Left VM requesting return call      Outcome: Outpatient Spiritual Care Services (SCS) team member attempted to call the patient. There was no answer and voice message was left encouraging patient to contact Outpatient SCS. Contact information for Outpatient SCS provided. Another team member was able to speak with the pt and document her health care proxy and wishes regarding hospitalization, ventilation, and CPR on 4/13/21. I am mailing advance directives forms to the pt in hopes she will call us to complete these important documents. Next Step:   [] ACP scheduled conversation  [] Outreach again in one week               [] Email / Mail ACP Info Sheets  [x] Email / Mail Advance Directive            [x] Close Referral. Routing closure to referring provider/staff and to ACP Specialist .      Thank you for this referral.

## 2021-05-13 NOTE — PROGRESS NOTES
Cecily Hughes   1950, 69 y.o. female   <[id number]>       Referring Provider: Marvin Rosales MD   Referral Type: In an order in [facility]    Reason for Visit: Evaluation of suspected change in hearing, tinnitus, or balance. ADULT AUDIOLOGIC EVALUATION      Cecily Hughes is a 69 y.o. female seen today, 5/20/2021, for an initial audiologic evaluation. AUDIOLOGIC AND OTHER PERTINENT MEDICAL HISTORY:        Cecily Hughes noted decreased hearing bilaterally, had hearing tested at [facility] about 3 years ago and noted hearing loss in both ears; some noise exposure from loud environments such as at [employer] with live music, she keeps ear plugs with her regularly. Cecily Hughes denied otalgia, aural fullness, otorrhea, tinnitus, dizziness, imbalance, history of head trauma, history of ear surgery, and family history of hearing loss. IMPRESSIONS:       Today's results are consistent with bilateral sensorineural hearing loss with normal middle ear function and excellent word recognition for soft conversational speech bilaterally. Discussed good communication strategies, safe listening levels, and future considerations for amplification. Recommended appointment with ENT for cerumen removal.    ASSESSMENT AND FINDINGS:       Otoscopy revealed: Significant cerumen observed bilaterally. Patient vocalized consent for cerumen to be removed from both ears today. Using lighted curette tools, attempted to remove cerumen, however, only minimal amounts were able to be removed. Cerumen was too deep to be removed today. Otoscopy revealed significant cerumen in canals following attempted cerumen removal with slight redness noted in canals. Tympanometry showed normal middle ear function; proceeded testing using supra aural headphones. Recommended ENT cerumen removal.        RIGHT EAR:  Hearing Sensitivity: Within normal limits through 2000 Hz sloping to moderate sensorineural hearing loss.   Speech

## 2021-05-17 ENCOUNTER — CLINICAL DOCUMENTATION (OUTPATIENT)
Dept: SPIRITUAL SERVICES | Age: 71
End: 2021-05-17

## 2021-05-17 NOTE — ACP (ADVANCE CARE PLANNING)
Advance Care Planning   Advance Care Planning Note  Ambulatory Spiritual Care Services    Date:  5/17/2021    Received request from [id number] [address] Provider and patient. Consultation conversation participants:   Patient who understands ACP conversation     Goals of ACP Conversation:  Discuss advance care planning documents    Health Care Decision Makers:      Primary Decision Maker: Enrike Arroyo - Brother/Sister - [phone]     Today we scheduled an appointment to explain and complete the patient's medical power of  and living will documents. Advance Care Planning Documents (Patient Wishes)  Currently on file:   None, however the pt's wishes regarding hospitalization, ventilation, and CPR were charted on 4/13/21. Assessment:   Pt was pleasant and happy to be able to get these important documents completed. Interventions:  Explained the process and gave options of day/time/place to meet. Outcomes:  [facility], this  will meet with pt othere on 5/19/21 at 1500hrs. Patient / Healthcare Decision Maker Instructions: Follow up with  to continue their ACP conversation.     Electronically signed by Magalis Diego, [email or web address] on 5/17/2021 at 4:38 PM.

## 2021-05-20 ENCOUNTER — CLINICAL DOCUMENTATION (OUTPATIENT)
Dept: SPIRITUAL SERVICES | Age: 71
End: 2021-05-20

## 2021-05-20 ENCOUNTER — PROCEDURE VISIT (OUTPATIENT)
Dept: AUDIOLOGY | Age: 71
End: 2021-05-20
Payer: MEDICARE

## 2021-05-20 DIAGNOSIS — H90.3 SENSORINEURAL HEARING LOSS, BILATERAL: Primary | ICD-10-CM

## 2021-05-20 PROCEDURE — 92567 TYMPANOMETRY: CPT | Performed by: AUDIOLOGIST

## 2021-05-20 PROCEDURE — 92557 COMPREHENSIVE HEARING TEST: CPT | Performed by: AUDIOLOGIST

## 2021-05-20 NOTE — Clinical Note
Dr. Rowdy Johnson,    Thank you for your referral for audiologic testing on this patient. Today's results are consistent with bilateral sensorineural hearing loss with normal middle ear function and excellent word recognition for soft conversational speech bilaterally. Discussed good communication strategies, safe listening levels, and future considerations for amplification. Recommended appointment with ENT for cerumen removal.  If you have any questions, or if there is anything else you need, please let me know.       Best,    [address], [place]  Audiologist  ---  [address]  ENT - Audiology

## 2021-05-20 NOTE — PATIENT INSTRUCTIONS
Good Communication Strategies    Communication can be challenging for anyone, but can be especially difficult for those with some degree of hearing loss. While we may not be able to control every factor that may lead to difficulty with communication, there are Good Communication Strategies that we can all use in our day-to-day lives. Communication takes both parties working together for it to be successful. Tips as a Listener:   1. Control your environment. It is important to limit the amount of background noise in the room when possible. You should also consider having a good light source in the room to best see the other person. 2. Ask for clarification. Instead of saying \"What?\", you can use parts of what you heard to make a new question. For example, if you heard the word \"Thursday\" but not the rest of the week, you may ask \"What was that about Thursday? \" or \"What did you want to do Thursday? \". This shows the person talking that you are listening and will help them better explain what they are saying. 3. Be an advocate for yourself. If you are hearing but not understanding, tell the other person \"I can hear you, but I need you to slow down when you speak. \"  Or if someone is facing the other direction, say \"I cannot hear you when you are not looking at me when we talk. \"       Tips as a Talker:   - Sit or stand 3 to 6 feet away to maximize audibility         -- It is unrealistic to believe someone else will fully hear your message if you are speaking from across the room or in a different room in the house   - Stay at eye level to help with visual cues   - Make sure you have the persons attention before speaking   - Use facial expressions and gestures to accentuate your message   - Raise your voice slightly (do not scream)   - Speak slowly and distinctly   - Use short, simple sentences   - Rephrase your words if the person is having a hard time understanding you    - To avoid distortion, dont speak directly into a persons ear      Some additional items that may be helpful:   - Remain patient - this is important for both parties   - Write down items that still cannot be heard/understood. You may write with pen/paper or consider typing/texting on a cell phone or smart device. - If background noise is unavoidable, try to keep yourself in a good position in the room. By sitting at a li on the side of the restaurant (preferably a corner), it will be easier to communicate than if you were sitting at a table in the middle with background noise surrounding you. Keep yourself positioned away from music speakers or heavy foot traffic.   - If you have difficulty with the television, consider these options:      -- Use closed-captioning, which is a setting you can turn on that displays the spoken words in a written form on the screen. There may be a slight delay, but this can help fill in missing information. This can be especially helpful when watching programs with accented speech. -- Consider use of a sound bar or speakers that come from the front of the TV. With modern flat screen TVs, many of them have speakers that come out of the back of the device, which makes sound bounce off the wall behind it, then go into the room. Sound bars can allow the sound to go straight in your direction and can improve sound quality. -- Consider ear level devices to help improve the volume and/or sound quality of the program.  There are devices that work like headphones that you can adjust the volume for your ears while others can have the volume at a more comfortable level, such as \"TV Ears\". Most hearing aids have devices that allow them to connect directly to the TV and improve sound quality. Hearing Loss: Care Instructions  Your Care Instructions      Hearing loss is a sudden or slow decrease in how well you hear. It can range from mild to profound.  Permanent hearing loss can occur with aging, and it can happen when you are exposed long-term to loud noise. Examples include listening to loud music, riding motorcycles, or being around other loud machines. Hearing loss can affect your work and home life. It can make you feel lonely or depressed. You may feel that you have lost your independence. But hearing aids and other devices can help you hear better and feel connected to others. Follow-up care is a key part of your treatment and safety. Be sure to make and go to all appointments, and call your doctor if you are having problems. It's also a good idea to know your test results and keep a list of the medicines you take. How can you care for yourself at home? · Avoid loud noises whenever possible. This helps keep your hearing from getting worse. Always wear hearing protection around loud noises. · If appropriate, wear hearing aid(s) as directed. It is recommended that hearing aids are worn during all waking hours to keep your brain active and give it access to the sounds it is missing. · If you are beginning your process with hearing aid(s), schedule a \"Hearing Aid Evaluation\" with an audiologist to discuss your lifestyle, features of hearing aid technology, and styles of hearing aids available. It is recommended that you contact your insurance company to determine if you have a hearing aid benefit, as this may dictate who you can see for these services. · Have hearing tests as your doctor suggests. They can show whether your hearing has changed. Your hearing aid may need to be adjusted. · Use other assistive devices as needed. These may include:  ? Telephone amplifiers and hearing aids that can connect to a television, stereo, radio, or microphone. ? Devices that use lights or vibrations. These alert you to the doorbell, a ringing telephone, or a baby monitor. ? Television closed-captioning. This shows the words at the bottom of the screen. Most new TVs can do this. ? TTY (text telephone). This lets you type messages back and forth on the telephone instead of talking or listening. These devices are also called TDD. When messages are typed on the keyboard, they are sent over the phone line to a receiving TTY. The message is shown on a monitor. · Use pagers, fax machines, text, and email if it is hard for you to communicate by telephone. · Try to learn a listening technique called speech-reading. It is not lip-reading. You pay attention to people's gestures, expressions, posture, and tone of voice. These clues can help you understand what a person is saying. Face the person you are talking to, and have him or her face you. Make sure the lighting is good. You need to see the other person's face clearly. · Think about counseling if you need help to adjust to your hearing loss. When should you call for help? Watch closely for changes in your health, and be sure to contact your doctor if:    · You think your hearing is getting worse. · You have new symptoms, such as dizziness or nausea.

## 2021-05-20 NOTE — ACP (ADVANCE CARE PLANNING)
Advance Care Planning   Advance Care Planning Note  Ambulatory Spiritual Care Services    Date:  5/19/2021    Received request from [id number] [address] [facility] Provider and patient. Consultation conversation participants:   Patient who understands ACP conversation     Goals of ACP Conversation:  Discuss advance care planning documents    Health Care Decision Makers:      Primary Decision Maker: Ana Lilia Rincon - Brother/Sister - [phone]    Secondary Decision Maker: Sonda Dakin - Niece/Nephew - [phone] (Patient Wishes)  After our appointment, we now have on file:   Healthcare Power of /Advance Directive Appointment of Health Care Agent  Living Will/Advance Directive    Assessment:   Pt was very pleasant and appreciative to meet with this outpatient spiritual care  to assist her in completing her healthcare power of  and living will. Pt does have a son in [place], but feels more comfortable with her sister being her POA. We also discussed the patient's wishes to do a total body bequeathal. I explained that she cannot be an organ/tissue donor AND a total body bequeathal anymore. It is one or other according to policies. She will speak with the Body Bequeathal Department at [facility] and fill out their forms. However, if she ineligible to donate her body to , she does want to be an organ/tissue donor. Interventions:  Provided education on documents for clarity and greater understanding. Assisted in the completion of documents according to patient's wishes at this time. Returned original document(s) to patient, as well as copies for distribution to appointed agents. Outcomes:  New advance directive completed. Copy of advance directive given to staff to scan into medical record. Routed ACP note to attending provider or other IDT member.     Patient / Healthcare Decision Maker Instructions:  Keep her original completed advance directives in a safe place.     Electronically signed by ERINN Sullivan, Radha, [email or web address] on 5/20/2021 at 11:25 AM.

## 2021-05-24 ENCOUNTER — OFFICE VISIT (OUTPATIENT)
Dept: ENT CLINIC | Age: 71
End: 2021-05-24
Payer: MEDICARE

## 2021-05-24 VITALS — SYSTOLIC BLOOD PRESSURE: 126 MMHG | TEMPERATURE: 97.7 F | DIASTOLIC BLOOD PRESSURE: 77 MMHG | HEART RATE: 63 BPM

## 2021-05-24 DIAGNOSIS — H61.23 BILATERAL IMPACTED CERUMEN: Primary | ICD-10-CM

## 2021-05-24 PROCEDURE — 69210 REMOVE IMPACTED EAR WAX UNI: CPT | Performed by: OTOLARYNGOLOGY

## 2021-05-24 RX ORDER — VIT C/B6/B5/MAGNESIUM/HERB 173 50-5-6-5MG
CAPSULE ORAL
COMMUNITY
End: 2022-10-10 | Stop reason: ALTCHOICE

## 2021-09-16 ENCOUNTER — TELEPHONE (OUTPATIENT)
Dept: INTERNAL MEDICINE CLINIC | Age: 71
End: 2021-09-16

## 2021-09-16 NOTE — TELEPHONE ENCOUNTER
Patient missed her Appointment scheduled for 9/16/21 @ 10:30am.  LVM for patient to call back to reschedule.

## 2021-09-27 NOTE — PROGRESS NOTES
Date of Visit:  2021    CC: Celia Jenkins (: 1950) is a 66 y.o. female, established patient, here for evaluation/re-evaluation of the following medical concerns:    ASSESSMENT/PLAN:  Essential hypertension  Assessment & Plan:  Well-controlled. Continue current antihypertensive regimen. She will have her pending labs drawn today. Hyperglycemia  Assessment & Plan:  Importance of continued lifestyle changes stressed to help prevent progression to diabetes. Major depressive disorder with single episode, in full remission [facility])  Assessment & Plan:  Adequately controlled on current dose of Celexa and stress management strategies- continue. Osteopenia of multiple sites  Assessment & Plan:  Continue vitamin D supplement at current dose, high calcium diet and weight-bearing exercise. Dexa Scan ordered. Return in about 6 months (around 3/28/2022) for MEDICARE [employer]. Vitals:    21 1334   BP: 112/82   Pulse: 63   Resp: 16   SpO2: 98%   Weight: 169 lb (76.7 kg)   Height: 5' 5\" (1.651 m)      Estimated body mass index is 28.12 kg/m² as calculated from the following:    Height as of this encounter: 5' 5\" (1.651 m). Weight as of this encounter: 169 lb (76.7 kg). Wt Readings from Last 3 Encounters:   21 169 lb (76.7 kg)   21 166 lb (75.3 kg)   20 173 lb (78.5 kg)     BP Readings from Last 3 Encounters:   21 112/82   21 126/77   20 114/68     HPI  Hypertension/hyperglycemia:  Home blood pressure monitoring: No.  She is adherent to a low sodium diet and trying to limit simple carbs. Patient denies chest pain, shortness of breath, lightheadedness and palpitations. Denies polyuria/polydipsia. Antihypertensive medication side effects: no medication side effects noted. Use of agents associated with hypertension: occasional NSAIDs. Mood Disorder:  Patient presents for follow-up of depression.  Current complaints include: intermittent anxiety. She denies anhedonia, depressed mood, tearfulness, feelings of hopelessness, feelings of worthlessness/excessive guilt, insomnia, irritability, excessive worry and suicidal thoughts or behavior. Symptoms/signs of cj: none. External stressors: COVID-19. Current treatment includes: Celexa- 30 mg qd, meditation. Medication side effects: none. Osteopenia:  High calcium diet and takes vitamin D supplement consistently. Plenty of weight-bearing exercise. ROS  As documented above    Physical Exam  Constitutional:       General: She is not in acute distress. Appearance: She is well-developed. Eyes:      Conjunctiva/sclera: Conjunctivae normal.   Neck:      Thyroid: No thyroid mass or thyromegaly. Cardiovascular:      Rate and Rhythm: Normal rate and regular rhythm. Heart sounds: Normal heart sounds. No murmur heard. No friction rub. No gallop. Pulmonary:      Effort: Pulmonary effort is normal. No respiratory distress. Breath sounds: Normal breath sounds. No wheezing, rhonchi or rales. Musculoskeletal:      Right lower leg: No edema. Left lower leg: No edema. Lymphadenopathy:      Cervical: No cervical adenopathy. Skin:     General: Skin is warm and dry. Findings: No erythema or rash. Neurological:      Mental Status: She is alert and oriented to person, place, and time. Psychiatric:         Speech: Speech normal.         Behavior: Behavior normal.         Thought Content: Thought content normal.         Judgment: Judgment normal.           --Jan Lewis MD on 9/28/2021 at 2:09 PM    An electronic signature was used to authenticate this note.

## 2021-09-28 ENCOUNTER — OFFICE VISIT (OUTPATIENT)
Dept: INTERNAL MEDICINE CLINIC | Age: 71
End: 2021-09-28
Payer: MEDICARE

## 2021-09-28 VITALS
BODY MASS INDEX: 28.16 KG/M2 | DIASTOLIC BLOOD PRESSURE: 82 MMHG | HEIGHT: 65 IN | SYSTOLIC BLOOD PRESSURE: 112 MMHG | HEART RATE: 63 BPM | OXYGEN SATURATION: 98 % | RESPIRATION RATE: 16 BRPM | WEIGHT: 169 LBS

## 2021-09-28 DIAGNOSIS — M85.89 OSTEOPENIA OF MULTIPLE SITES: ICD-10-CM

## 2021-09-28 DIAGNOSIS — F32.5 MAJOR DEPRESSIVE DISORDER WITH SINGLE EPISODE, IN FULL REMISSION (HCC): ICD-10-CM

## 2021-09-28 DIAGNOSIS — I10 ESSENTIAL HYPERTENSION: Primary | ICD-10-CM

## 2021-09-28 DIAGNOSIS — R73.9 HYPERGLYCEMIA: ICD-10-CM

## 2021-09-28 PROCEDURE — G0009 ADMIN PNEUMOCOCCAL VACCINE: HCPCS | Performed by: INTERNAL MEDICINE

## 2021-09-28 PROCEDURE — 90732 PPSV23 VACC 2 YRS+ SUBQ/IM: CPT | Performed by: INTERNAL MEDICINE

## 2021-09-28 PROCEDURE — 99214 OFFICE O/P EST MOD 30 MIN: CPT | Performed by: INTERNAL MEDICINE

## 2021-09-28 RX ORDER — CITALOPRAM 20 MG/1
TABLET ORAL
Qty: 135 TABLET | Refills: 1 | Status: SHIPPED | OUTPATIENT
Start: 2021-09-28 | End: 2022-04-01 | Stop reason: SDUPTHER

## 2021-09-28 RX ORDER — LISINOPRIL AND HYDROCHLOROTHIAZIDE 12.5; 1 MG/1; MG/1
TABLET ORAL
Qty: 90 TABLET | Refills: 1 | Status: SHIPPED | OUTPATIENT
Start: 2021-09-28 | End: 2022-04-01 | Stop reason: SDUPTHER

## 2021-09-28 SDOH — ECONOMIC STABILITY: TRANSPORTATION INSECURITY
IN THE PAST 12 MONTHS, HAS THE LACK OF TRANSPORTATION KEPT YOU FROM MEDICAL APPOINTMENTS OR FROM GETTING MEDICATIONS?: NO

## 2021-09-28 SDOH — ECONOMIC STABILITY: HOUSING INSECURITY
IN THE LAST 12 MONTHS, WAS THERE A TIME WHEN YOU DID NOT HAVE A STEADY PLACE TO SLEEP OR SLEPT IN A SHELTER (INCLUDING NOW)?: NO

## 2021-09-28 SDOH — ECONOMIC STABILITY: FOOD INSECURITY: WITHIN THE PAST 12 MONTHS, THE FOOD YOU BOUGHT JUST DIDN'T LAST AND YOU DIDN'T HAVE MONEY TO GET MORE.: NEVER TRUE

## 2021-09-28 SDOH — ECONOMIC STABILITY: TRANSPORTATION INSECURITY
IN THE PAST 12 MONTHS, HAS LACK OF TRANSPORTATION KEPT YOU FROM MEETINGS, WORK, OR FROM GETTING THINGS NEEDED FOR DAILY LIVING?: NO

## 2021-09-28 SDOH — ECONOMIC STABILITY: INCOME INSECURITY: IN THE LAST 12 MONTHS, WAS THERE A TIME WHEN YOU WERE NOT ABLE TO PAY THE MORTGAGE OR RENT ON TIME?: NO

## 2021-09-28 SDOH — ECONOMIC STABILITY: FOOD INSECURITY: WITHIN THE PAST 12 MONTHS, YOU WORRIED THAT YOUR FOOD WOULD RUN OUT BEFORE YOU GOT MONEY TO BUY MORE.: NEVER TRUE

## 2021-09-28 ASSESSMENT — SOCIAL DETERMINANTS OF HEALTH (SDOH): HOW HARD IS IT FOR YOU TO PAY FOR THE VERY BASICS LIKE FOOD, HOUSING, MEDICAL CARE, AND HEATING?: NOT HARD AT ALL

## 2021-09-28 NOTE — ASSESSMENT & PLAN NOTE
Well-controlled. Continue current antihypertensive regimen. She will have her pending labs drawn today.

## 2021-09-28 NOTE — ASSESSMENT & PLAN NOTE
Continue vitamin D supplement at current dose, high calcium diet and weight-bearing exercise. Dexa Scan ordered.

## 2021-09-28 NOTE — PATIENT INSTRUCTIONS
Calcium and Vitamin D     Why do I need calcium and vitamin D? Calcium and vitamin D are important for bone health. Nerves, muscles, and blood vessels need calcium to work. Vitamin D helps the body absorb calcium, and is needed for immune system function. There is some evidence that vitamin D helps prevent cancer and cardiovascular disease. What are sources of calcium and vitamin D? Calcium is found in foods. Dairy products are good sources. Eight ounces of yogurt (228 gram) or milk (1 cup [236 mL]), or a 1.5-oz. (43 gram) serving of cheese, can provide around 300 mg. Fortified orange juice can provide 300 mg per 8-oz. (236 mL) serving. Vitamin D is made by sun-exposed skin and is found in some foods. One of the best sources is salmon. A 3-oz. (86 gram) serving of sockeye salmon provides almost 800 IU. A 3-oz. serving of tuna canned in water provides about 150 IU. Dairy products fortified with vitamin D are good sources. Examples include a cup of fortified milk (115 to 124 IU), a cup of fortified orange juice (80 IU), or 6-ozs. (171 grams) of fortified yogurt (80 IU). Calcium and vitamin D are also available as supplements. Do I need a supplement? Are they safe? Many people are low on vitamin D. It is hard to get enough vitamin D from food, and most people don't get much sun exposure because they use sunscreens, spend long hours indoors, or live at a [address] Most people need a vitamin D supplement. Ask if you should have your vitamin D level checked. People typically get 300 mg calcium from their diet daily, not including dairy. If you include two servings of high-calcium foods (e.g., dairy), you can get around 900 mg per day. Supplementation with just 300 mg of calcium daily, or adding a third high-calcium serving, will provide 1200 mg daily. You may have heard calcium supplements are unsafe.  While there has been negative press about heart attacks and prostate cancer, calcium supplements have not been proven to be unsafe. But don't go overboard with calcium supplements; get your calcium from diet when possible. However, avoid calcium supplements from coral or dolomite (a kind of limestone); they can contain heavy metals like lead. How do I choose a calcium or vitamin D supplement? Most calcium products contain calcium carbonate (e.g., Tums, Caltrate) or calcium citrate (e.g., Citracal). Both work. Calcium carbonate is cheap and provides the most calcium per dose. (Read the product label to check the calcium content \"per serving,\" as this can vary depending on the type of calcium you select.) Calcium citrate may be better for patients who don't absorb calcium as well, like older people or those on heartburn medications (e.g., omeprazole [Prilosec; Losec (Shane)], ranitidine [Zantac], others). Calcium is best absorbed if no more than 500 mg is taken at a time. Some supplements contain other ingredients (e.g., magnesium, vitamin K), but these don't work any better than those with just calcium. Vitamin D is available over-the-counter in combination with calcium or by itself. There are also high-dose vitamin D products that are prescribed if you have low vitamin D levels. It is okay to take a multivitamin or eat vitamin D-containing foods while taking prescription-strength vitamin D. Vitamin D is available as either vitamin D2 or vitamin D3. Either can be used. Look for a vitamin D supplement that is USP Verified (in [facility]), a product with a Natural Product Number [NPN]). These products meet certain quality standards. How much calcium and vitamin D do I need? Women up to 48years old and men up to age 79 need 1000 mg of calcium daily. Women over 48years old and men over 70 need 1200 mg of calcium daily. The vitamin D RDA (recommended dietary allowance) has recently been increased to 600 IU daily for adults under age 79 and 800 IU daily for people over age 79 to keep bones strong.  But most experts recommend that adults get 800 IU to 2000 IU of vitamin D daily for optimal health benefits.

## 2021-11-19 ENCOUNTER — HOSPITAL ENCOUNTER (OUTPATIENT)
Dept: GENERAL RADIOLOGY | Age: 71
Discharge: HOME | End: 2021-11-19
Payer: MEDICARE

## 2021-11-19 DIAGNOSIS — Z78.0 POSTMENOPAUSAL: ICD-10-CM

## 2021-11-19 DIAGNOSIS — M85.89 OSTEOPENIA OF MULTIPLE SITES: ICD-10-CM

## 2021-11-19 PROCEDURE — 77080 DXA BONE DENSITY AXIAL: CPT

## 2022-04-01 ENCOUNTER — OFFICE VISIT (OUTPATIENT)
Dept: INTERNAL MEDICINE CLINIC | Age: 72
End: 2022-04-01
Payer: MEDICARE

## 2022-04-01 VITALS
WEIGHT: 167 LBS | BODY MASS INDEX: 27.82 KG/M2 | HEART RATE: 72 BPM | OXYGEN SATURATION: 97 % | SYSTOLIC BLOOD PRESSURE: 104 MMHG | HEIGHT: 65 IN | DIASTOLIC BLOOD PRESSURE: 58 MMHG

## 2022-04-01 DIAGNOSIS — F32.5 MAJOR DEPRESSIVE DISORDER WITH SINGLE EPISODE, IN FULL REMISSION (HCC): ICD-10-CM

## 2022-04-01 DIAGNOSIS — I10 PRIMARY HYPERTENSION: ICD-10-CM

## 2022-04-01 DIAGNOSIS — R73.9 HYPERGLYCEMIA: ICD-10-CM

## 2022-04-01 DIAGNOSIS — Z00.00 ANNUAL PHYSICAL EXAM: Primary | ICD-10-CM

## 2022-04-01 PROCEDURE — G0439 PPPS, SUBSEQ VISIT: HCPCS | Performed by: INTERNAL MEDICINE

## 2022-04-01 RX ORDER — LISINOPRIL AND HYDROCHLOROTHIAZIDE 12.5; 1 MG/1; MG/1
TABLET ORAL
Qty: 90 TABLET | Refills: 1 | Status: SHIPPED | OUTPATIENT
Start: 2022-04-01 | End: 2022-10-31

## 2022-04-01 RX ORDER — CITALOPRAM 20 MG/1
TABLET ORAL
Qty: 135 TABLET | Refills: 1 | Status: SHIPPED | OUTPATIENT
Start: 2022-04-01 | End: 2022-10-31

## 2022-04-01 ASSESSMENT — PATIENT HEALTH QUESTIONNAIRE - PHQ9
2. FEELING DOWN, DEPRESSED OR HOPELESS: 1
1. LITTLE INTEREST OR PLEASURE IN DOING THINGS: 0
SUM OF ALL RESPONSES TO PHQ9 QUESTIONS 1 & 2: 1
SUM OF ALL RESPONSES TO PHQ QUESTIONS 1-9: 1

## 2022-04-01 ASSESSMENT — LIFESTYLE VARIABLES
HOW OFTEN DURING THE LAST YEAR HAVE YOU HAD A FEELING OF GUILT OR REMORSE AFTER DRINKING: 0
HOW OFTEN DURING THE LAST YEAR HAVE YOU FOUND THAT YOU WERE NOT ABLE TO STOP DRINKING ONCE YOU HAD STARTED: 0
HAVE YOU OR SOMEONE ELSE BEEN INJURED AS A RESULT OF YOUR DRINKING: 0
HOW OFTEN DURING THE LAST YEAR HAVE YOU BEEN UNABLE TO REMEMBER WHAT HAPPENED THE NIGHT BEFORE BECAUSE YOU HAD BEEN DRINKING: 0
HOW MANY STANDARD DRINKS CONTAINING ALCOHOL DO YOU HAVE ON A TYPICAL DAY: 1 OR 2
HOW OFTEN DURING THE LAST YEAR HAVE YOU FAILED TO DO WHAT WAS NORMALLY EXPECTED FROM YOU BECAUSE OF DRINKING: 0
HAS A RELATIVE, FRIEND, DOCTOR, OR ANOTHER HEALTH PROFESSIONAL EXPRESSED CONCERN ABOUT YOUR DRINKING OR SUGGESTED YOU CUT DOWN: 0
HOW OFTEN DO YOU HAVE A DRINK CONTAINING ALCOHOL: 2-3 TIMES A WEEK
HOW OFTEN DURING THE LAST YEAR HAVE YOU NEEDED AN ALCOHOLIC DRINK FIRST THING IN THE MORNING TO GET YOURSELF GOING AFTER A NIGHT OF HEAVY DRINKING: 0

## 2022-04-01 NOTE — PROGRESS NOTES
Medicare Annual Wellness Visit  Darlene Saenz  4/1/2022    ASSESSMENT/PLAN   Annual physical exam  Shingrix per pharmacy  FIT test  She will schedule her mammogram  Primary hypertension  Assessment & Plan:  Well-controlled. Continue current antihypertensive regimen. Orders:  -     Comprehensive Metabolic Panel, Fasting; Future  -     TSH with Reflex; Future  Hyperglycemia  Assessment & Plan:  Importance of continued lifestyle changes stressed to help prevent progression to diabetes. Orders:  -     Hemoglobin A1C; Future  Major depressive disorder with single episode, in full remission ([address]  Assessment & Plan:  Well-controlled on current dose of Celexa- continue. Recommendations for Preventive Services Due: see orders and patient instructions/AVS.  Recommended screening schedule for the next 5-10 years is provided to the patient in written form: see Patient Instructions/AVS.    Return in 6 months (on 10/1/2022) for 30 MIN F/U.    [address] [place] [address] is here for Medicare AWV    Patient's complete Health Risk Assessment and screening values have been reviewed and are found in Flowsheets. The following risks were reviewed today and where indicated follow up appointments were made and/or referrals ordered. Positive Risk Factor Screenings with Interventions:        Alcohol Screening:  AUDIT Total Score: 3    A score of 8 or more is associated with harmful or hazardous drinking. A score of 13 or more in women, and 15 or more in men, is likely to indicate alcohol dependence.     Substance Abuse - Alcohol Interventions:  No evidence of harmful or hazardous drinking          General Health and ACP:  General  In general, how would you say your health is?: Good  In the past 7 days, have you experienced any of the following: New or Increased Pain, New or Increased Fatigue, Loneliness, Social Isolation, Stress or Anger?: (!) Yes  Select all that apply: (!) Loneliness,Stress,Anger  Do you get the social and emotional support that you need?: Yes  Do you have a Living Will?: Yes    Advance Directives     Power of  Living Will ACP-Advance Directive ACP-Power of     Not on File Filed on 05/19/21 Filed Not on File      General Health Risk Interventions:  · Due to external stressors- suggested counseling     Hearing/Vision:  Do you or your family notice any trouble with your hearing that hasn't been managed with hearing aids?: No  Do you have difficulty driving, watching TV, or doing any of your daily activities because of your eyesight?: No  Have you had an eye exam within the past year?: (!) No  No exam data present    Hearing/Vision Interventions:  · Vision concerns:  patient encouraged to make appointment with his/her eye specialist        OBJECTIVE     Wt Readings from Last 3 Encounters:   04/01/22 167 lb (75.8 kg)   09/28/21 169 lb (76.7 kg)   04/21/21 166 lb (75.3 kg)     BP Readings from Last 3 Encounters:   04/01/22 (!) 104/58   09/28/21 112/82   05/24/21 126/77     Body mass index is 27.79 kg/m². Physical Exam  Constitutional:       General: She is not in acute distress. Appearance: She is well-developed. Eyes:      Conjunctiva/sclera: Conjunctivae normal.   Neck:      Thyroid: No thyroid mass or thyromegaly. Cardiovascular:      Rate and Rhythm: Normal rate and regular rhythm. Heart sounds: Murmur heard. Systolic (RUSB) murmur is present with a grade of 2/6. No friction rub. No gallop. Pulmonary:      Effort: Pulmonary effort is normal. No respiratory distress. Breath sounds: Normal breath sounds. No wheezing, rhonchi or rales. Musculoskeletal:      Right lower leg: No edema. Left lower leg: No edema. Lymphadenopathy:      Cervical: No cervical adenopathy. Skin:     General: Skin is warm and dry. Findings: No erythema or rash. Neurological:      Mental Status: She is alert and oriented to person, place, and time.    Psychiatric: Speech: Speech normal.         Behavior: Behavior normal.         Thought Content: Thought content normal.         Judgment: Judgment normal.       Allergies   Allergen Reactions    Sudafed [Pseudoephedrine Hcl]      Prior to Visit Medications    Medication Sig Taking?  Authorizing Provider   lisinopril-hydroCHLOROthiazide (PRINZIDE;ZESTORETIC) 10-12.5 MG per tablet TAKE ONE TABLET BY MOUTH DAILY Yes Dionne Paredes MD   citalopram (CELEXA) 20 MG tablet TAKE ONE AND ONE-HALF TABLET BY MOUTH DAILY Yes Dionne Paredes MD   Turmeric 500 MG CAPS Take by mouth Yes Historical Provider, MD   FLUTICASONE PROPIONATE NA by Nasal route Yes Historical Provider, MD   BIOTIN PO Take by mouth Yes Historical Provider, MD   Omega-3 Fatty Acids (FISH OIL) 1000 MG CPDR Take 2,000 mg by mouth Yes Historical Provider, MD   vitamin E 400 UNIT capsule Take 400 Units by mouth daily Yes Historical Provider, MD   Cholecalciferol (VITAMIN D3) 2000 units CAPS Take by mouth Yes Historical Provider, MD   Coenzyme Q10 (CO Q 10) 60 MG CAPS Take by mouth Yes Historical Provider, MD   potassium chloride (KLOR-CON) 20 MEQ packet Take 20 mEq by mouth daily  Yes Historical Provider, MD   aspirin 81 MG tablet Take 81 mg by mouth daily  Patient not taking: Reported on 4/1/2022  Historical Provider, MD [place] (Including outside providers/suppliers regularly involved in providing care):   Patient Care Team:  Dionne Paredes MD as PCP - General (Internal Medicine)  Dionne Paredes MD as PCP - [facility] Empaneled Provider    Current Health Maintenance Status  Health Maintenance   Topic Date Due    Shingles Vaccine (1 of 2) Never done    Annual Wellness Visit (AWV)  03/17/2022    Colorectal Cancer Screen  04/16/2022    Breast cancer screen  04/21/2022    Flu vaccine (Season Ended) 09/01/2022    A1C test (Diabetic or Prediabetic)  09/28/2022    Potassium monitoring  09/28/2022    Creatinine monitoring  09/28/2022    Depression Monitoring 04/01/2023    DEXA (modify frequency per FRAX score)  11/19/2023    Lipid screen  09/28/2026    DTaP/Tdap/Td vaccine (2 - Td or Tdap) 06/06/2029    Pneumococcal 65+ years Vaccine  Completed    COVID-19 Vaccine  Completed    Hepatitis C screen  Completed    Hepatitis A vaccine  Aged Out    Hepatitis B vaccine  Aged Out    Hib vaccine  Aged Out    Meningococcal (ACWY) vaccine  Aged [employer] History   Administered Date(s) Administered    COVID-19, Pfizer Purple top, DILUTE for use, 12+ yrs, 30mcg/0.3mL dose 02/11/2021, 03/02/2021, 12/02/2021    Pneumococcal Polysaccharide ([id number]) 09/28/2021    Tdap (Boostrix, Adacel) 06/06/2019       Reviewed and updated this visit:  Tobacco  Allergies  Meds  Problems  Med Hx  Surg Hx  Soc Hx  Fam Hx

## 2022-04-01 NOTE — PATIENT INSTRUCTIONS
Dermatologists:    Sarah Larkin. MD Zabrina Barr)  [phone]    MD Zabrina Bowen)  [phone]     [place] office  Adonis Suresh, General Dermatology  [facility] - atypical wounds  [phone]  [facility]             [phone]  [address]  [address]                 Personalized Preventive Plan for Jarek Friend - 4/1/2022  Medicare offers a range of preventive health benefits. Some of the tests and screenings are paid in full while other may be subject to a deductible, co-insurance, and/or copay. Some of these benefits include a comprehensive review of your medical history including lifestyle, illnesses that may run in your family, and various assessments and screenings as appropriate. After reviewing your medical record and screening and assessments performed today your provider may have ordered immunizations, labs, imaging, and/or referrals for you. A list of these orders (if applicable) as well as your Preventive Care list are included within your After Visit Summary for your review. Other Preventive Recommendations:    · A preventive eye exam performed by an eye specialist is recommended every 1-2 years to screen for glaucoma; cataracts, macular degeneration, and other eye disorders. · A preventive dental visit is recommended every 6 months. · Try to get at least 150 minutes of exercise per week or 10,000 steps per day on a pedometer . · Order or download the FREE \"Exercise & Physical Activity: Your Everyday Guide\" from [employer]. Call 2-436.110.5851 or search [employer] online. · You need 2327-6047 mg of calcium and 1315-6829 IU of vitamin D per day.  It is possible to meet your calcium requirement with diet alone, but a vitamin D supplement is usually necessary to meet this goal.  · When exposed to the sun, use a sunscreen that protects against both UVA and UVB radiation with an SPF of 30 or greater. Reapply every 2 to 3 hours or after sweating, drying off with a towel, or swimming. · Always wear a seat belt when traveling in a car. Always wear a helmet when riding a bicycle or motorcycle.

## 2022-04-19 ENCOUNTER — NURSE ONLY (OUTPATIENT)
Dept: INTERNAL MEDICINE CLINIC | Age: 72
End: 2022-04-19
Payer: MEDICARE

## 2022-04-19 ENCOUNTER — TELEPHONE (OUTPATIENT)
Dept: INTERNAL MEDICINE CLINIC | Age: 72
End: 2022-04-19

## 2022-04-19 DIAGNOSIS — Z12.11 COLON CANCER SCREENING: Primary | ICD-10-CM

## 2022-04-19 LAB
CONTROL: NORMAL
HEMOCCULT STL QL: NEGATIVE

## 2022-04-19 PROCEDURE — 82274 ASSAY TEST FOR BLOOD FECAL: CPT | Performed by: INTERNAL MEDICINE

## 2022-04-19 NOTE — TELEPHONE ENCOUNTER
Patient states she dropped this off for Dr. Ginette Brown to see. It's what is recommended for IBS. She states Dr. Ginette Brown was interested in knowing this, and thought a visual would help. She doesn't want to pick this back up. She had No questions regarding this either.

## 2022-04-19 NOTE — TELEPHONE ENCOUNTER
Dr Mt Dixon had a box of IBgard on her desk with this patients name on it. We need to know what questions she has regarding this? Per Dr Mt Dixon it is ok for patient to take and has not contraindications. Does she want to pick the box back up? I tried to call patient but had to leave a VM.

## 2022-04-20 PROBLEM — E03.8 SUBCLINICAL HYPOTHYROIDISM: Status: ACTIVE | Noted: 2022-04-20

## 2022-09-07 ENCOUNTER — PATIENT MESSAGE (OUTPATIENT)
Dept: INTERNAL MEDICINE CLINIC | Age: 72
End: 2022-09-07

## 2022-09-08 NOTE — TELEPHONE ENCOUNTER
From: Nahum Parks  To: Dr. Guille Garcia  Sent: 9/7/2022 6:08 PM EDT  Subject: Covid booster? Is now the time for a Covid booster? Ive only had the one. Also, , i would like an in person appt. I have new moles growing that may be of concern?      Thanks   Adam Emery   [phone]

## 2022-09-08 NOTE — TELEPHONE ENCOUNTER
This is a good time to get another COVID booster since the vaccine has been updated. Please offer her an in person appt to look at her moles next week- can use 15 min same day if this is the only issue that needs to be addressed.

## 2022-09-16 ENCOUNTER — OFFICE VISIT (OUTPATIENT)
Dept: INTERNAL MEDICINE CLINIC | Age: 72
End: 2022-09-16
Payer: MEDICARE

## 2022-09-16 VITALS
BODY MASS INDEX: 27.76 KG/M2 | DIASTOLIC BLOOD PRESSURE: 62 MMHG | WEIGHT: 166.6 LBS | HEART RATE: 72 BPM | HEIGHT: 65 IN | SYSTOLIC BLOOD PRESSURE: 126 MMHG | OXYGEN SATURATION: 98 %

## 2022-09-16 DIAGNOSIS — L81.9 PIGMENTED SKIN LESION SUSPICIOUS FOR MALIGNANT NEOPLASM: ICD-10-CM

## 2022-09-16 PROCEDURE — 1123F ACP DISCUSS/DSCN MKR DOCD: CPT | Performed by: INTERNAL MEDICINE

## 2022-09-16 PROCEDURE — 99213 OFFICE O/P EST LOW 20 MIN: CPT | Performed by: INTERNAL MEDICINE

## 2022-10-10 ENCOUNTER — OFFICE VISIT (OUTPATIENT)
Dept: DERMATOLOGY | Age: 72
End: 2022-10-10
Payer: MEDICARE

## 2022-10-10 DIAGNOSIS — D22.5 MULTIPLE BENIGN MELANOCYTIC NEVI OF UPPER EXTREMITY, LOWER EXTREMITY, AND TRUNK: ICD-10-CM

## 2022-10-10 DIAGNOSIS — L57.0 ACTINIC KERATOSIS: Primary | ICD-10-CM

## 2022-10-10 DIAGNOSIS — D22.70 MULTIPLE BENIGN MELANOCYTIC NEVI OF UPPER EXTREMITY, LOWER EXTREMITY, AND TRUNK: ICD-10-CM

## 2022-10-10 DIAGNOSIS — L81.4 LENTIGINES: ICD-10-CM

## 2022-10-10 DIAGNOSIS — D22.60 MULTIPLE BENIGN MELANOCYTIC NEVI OF UPPER EXTREMITY, LOWER EXTREMITY, AND TRUNK: ICD-10-CM

## 2022-10-10 DIAGNOSIS — L82.1 OTHER SEBORRHEIC KERATOSIS: ICD-10-CM

## 2022-10-10 PROCEDURE — 1123F ACP DISCUSS/DSCN MKR DOCD: CPT | Performed by: INTERNAL MEDICINE

## 2022-10-10 PROCEDURE — 17000 DESTRUCT PREMALG LESION: CPT | Performed by: INTERNAL MEDICINE

## 2022-10-10 PROCEDURE — 17003 DESTRUCT PREMALG LES 2-14: CPT | Performed by: INTERNAL MEDICINE

## 2022-10-10 PROCEDURE — 99203 OFFICE O/P NEW LOW 30 MIN: CPT | Performed by: INTERNAL MEDICINE

## 2022-10-10 NOTE — PATIENT INSTRUCTIONS
Thank you for visiting [facility] today! Please follow the instructions below as we discussed in clinic:      We froze precancerous actinic keratoses on your face    Cryosurgery (Freezing) Wound Care Instructions    AFTER THE PROCEDURE:   You will notice swelling and redness around the site. This is normal.   You may experience a sharp or sore feeling for the next several days. For this discomfort, you may take acetaminophen (Tylenol©). A blister may develop at the treated area, sometimes as soon as by the end of the day. After several days, the blister will subside and a scab will form. If the area is bumped or traumatized during the first few days following freezing, you may develop bleeding into the blister, forming a blood blister. This is nothing to be alarmed about. If the blister is tense, uncomfortable, or much larger than the site that was frozen, you may pop the blister along its edge with a sterile needle (boiled, heated under a flame, or cleaned with alcohol) to allow the fluid to drain out. If the blister does not bother you, no treatment is needed. Do NOT peel off the top of the blister roof. It will act as a dressing on top of your wound. WOUND CARE:   You may shower or bathe as usual, but avoid scrubbing the areas that have been frozen. Cleanse the site twice a day with mild soapy water, and then apply a thin film of white petrolatum (Vaseline©). You do not need to cover the area, but can if you prefer. Do NOT allow the site to become dry or crusted, or attempt to dry it out with rubbing alcohol or hydrogen peroxide. Continue this regimen until the area is pink and healed. Depending on the size and location of your cryosurgery site, healing may take 2 to 4 weeks. The area may continue to be pink for several weeks, and over the next few months may become darker or lighter than the surrounding skin. This may be a permanent change.
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
## 2022-10-31 RX ORDER — CITALOPRAM 20 MG/1
TABLET ORAL
Qty: 135 TABLET | Refills: 0 | Status: SHIPPED | OUTPATIENT
Start: 2022-10-31

## 2022-10-31 RX ORDER — LISINOPRIL AND HYDROCHLOROTHIAZIDE 12.5; 1 MG/1; MG/1
TABLET ORAL
Qty: 90 TABLET | Refills: 0 | Status: SHIPPED | OUTPATIENT
Start: 2022-10-31

## 2022-10-31 NOTE — TELEPHONE ENCOUNTER
Last appointment: 9/16/2022  Next appointment: Visit date not found  Last refill: 4/1/22  Sent IPG message to schedule due/overdue appointment.

## 2022-12-20 ENCOUNTER — HOSPITAL ENCOUNTER (OUTPATIENT)
Dept: MAMMOGRAPHY | Age: 72
Discharge: HOME | End: 2022-12-20
Payer: MEDICARE

## 2022-12-20 VITALS — WEIGHT: 166 LBS | HEIGHT: 65 IN | BODY MASS INDEX: 27.66 KG/M2

## 2022-12-20 DIAGNOSIS — Z12.31 VISIT FOR SCREENING MAMMOGRAM: ICD-10-CM

## 2022-12-20 PROCEDURE — 77063 BREAST TOMOSYNTHESIS BI: CPT

## 2023-01-30 RX ORDER — CITALOPRAM 20 MG/1
TABLET ORAL
Qty: 135 TABLET | Refills: 0 | Status: SHIPPED | OUTPATIENT
Start: 2023-01-30

## 2023-01-30 NOTE — TELEPHONE ENCOUNTER
Last appointment: 9/16/2022  Next appointment: Visit date not found  Last refill: 10/31/2022    Left a message for patient to call back to schedule

## 2023-02-08 RX ORDER — LISINOPRIL AND HYDROCHLOROTHIAZIDE 12.5; 1 MG/1; MG/1
TABLET ORAL
Qty: 90 TABLET | Refills: 0 | Status: SHIPPED | OUTPATIENT
Start: 2023-02-08

## 2023-02-08 NOTE — TELEPHONE ENCOUNTER
Last appointment: 9/16/2022  Next appointment: Visit date not found  Last refill: 10/31/22   No return date given at last OV

## 2023-02-08 NOTE — TELEPHONE ENCOUNTER
She was due for follow up visit in October- visit on 9/16 was an acute. I sent script with no refills. Please contact her to schedule.

## 2023-05-05 RX ORDER — CITALOPRAM 20 MG/1
TABLET ORAL
Qty: 135 TABLET | Refills: 0 | Status: SHIPPED | OUTPATIENT
Start: 2023-05-05

## 2023-05-05 RX ORDER — LISINOPRIL AND HYDROCHLOROTHIAZIDE 12.5; 1 MG/1; MG/1
TABLET ORAL
Qty: 90 TABLET | Refills: 0 | Status: SHIPPED | OUTPATIENT
Start: 2023-05-05

## 2023-05-05 NOTE — TELEPHONE ENCOUNTER
Patient is calling to check status on refills that pharmacy sent in. Saw they were ready to be signed told patient Dr. Vijay Gonsalez is in clinic and will send refills by end of day.      Please advise

## 2023-06-28 ENCOUNTER — NURSE ONLY (OUTPATIENT)
Dept: INTERNAL MEDICINE CLINIC | Age: 73
End: 2023-06-28

## 2023-06-28 DIAGNOSIS — E03.8 SUBCLINICAL HYPOTHYROIDISM: ICD-10-CM

## 2023-06-28 DIAGNOSIS — R73.9 HYPERGLYCEMIA: ICD-10-CM

## 2023-06-28 DIAGNOSIS — I10 PRIMARY HYPERTENSION: ICD-10-CM

## 2023-06-28 LAB
ALBUMIN SERPL-MCNC: 4.6 G/DL (ref 3.4–5)
ALBUMIN/GLOB SERPL: 1.9 {RATIO} (ref 1.1–2.2)
ALP SERPL-CCNC: 63 U/L (ref 40–129)
ALT SERPL-CCNC: 15 U/L (ref 10–40)
ANION GAP SERPL CALCULATED.3IONS-SCNC: 11 MMOL/L (ref 3–16)
AST SERPL-CCNC: 20 U/L (ref 15–37)
BILIRUB SERPL-MCNC: 0.6 MG/DL (ref 0–1)
BUN SERPL-MCNC: 19 MG/DL (ref 7–20)
CALCIUM SERPL-MCNC: 9.5 MG/DL (ref 8.3–10.6)
CHLORIDE SERPL-SCNC: 98 MMOL/L (ref 99–110)
CHOLEST SERPL-MCNC: 166 MG/DL (ref 0–199)
CO2 SERPL-SCNC: 28 MMOL/L (ref 21–32)
CONTROL: NORMAL
CREAT SERPL-MCNC: 0.8 MG/DL (ref 0.6–1.2)
GFR SERPLBLD CREATININE-BSD FMLA CKD-EPI: >60 ML/MIN/{1.73_M2}
GLUCOSE P FAST SERPL-MCNC: 90 MG/DL (ref 70–99)
HDLC SERPL-MCNC: 54 MG/DL (ref 40–60)
HEMOCCULT STL QL: NEGATIVE
LDL CHOLESTEROL CALCULATED: 70 MG/DL
POTASSIUM SERPL-SCNC: 3.9 MMOL/L (ref 3.5–5.1)
PROT SERPL-MCNC: 7 G/DL (ref 6.4–8.2)
SODIUM SERPL-SCNC: 137 MMOL/L (ref 136–145)
TRIGL SERPL-MCNC: 208 MG/DL (ref 0–150)
TSH SERPL DL<=0.005 MIU/L-ACNC: 2.59 UIU/ML (ref 0.27–4.2)
VLDLC SERPL CALC-MCNC: 42 MG/DL

## 2023-06-29 PROBLEM — E78.1 HYPERTRIGLYCERIDEMIA: Status: ACTIVE | Noted: 2023-06-29

## 2023-06-29 LAB
EST. AVERAGE GLUCOSE BLD GHB EST-MCNC: 119.8 MG/DL
HBA1C MFR BLD: 5.8 %

## 2023-08-04 RX ORDER — CITALOPRAM 20 MG/1
TABLET ORAL
Qty: 135 TABLET | Refills: 1 | Status: SHIPPED | OUTPATIENT
Start: 2023-08-04

## 2023-08-18 RX ORDER — LISINOPRIL AND HYDROCHLOROTHIAZIDE 12.5; 1 MG/1; MG/1
TABLET ORAL
Qty: 90 TABLET | Refills: 1 | Status: SHIPPED | OUTPATIENT
Start: 2023-08-18

## 2023-12-06 PROBLEM — L81.9 PIGMENTED SKIN LESION SUSPICIOUS FOR MALIGNANT NEOPLASM: Status: RESOLVED | Noted: 2022-09-16 | Resolved: 2023-12-06

## 2023-12-06 PROBLEM — L57.0 MULTIPLE ACTINIC KERATOSES: Status: ACTIVE | Noted: 2023-12-06

## 2023-12-08 ENCOUNTER — OFFICE VISIT (OUTPATIENT)
Dept: INTERNAL MEDICINE CLINIC | Age: 73
End: 2023-12-08

## 2023-12-08 VITALS
SYSTOLIC BLOOD PRESSURE: 128 MMHG | HEART RATE: 69 BPM | OXYGEN SATURATION: 97 % | BODY MASS INDEX: 28.12 KG/M2 | DIASTOLIC BLOOD PRESSURE: 84 MMHG | WEIGHT: 169 LBS

## 2023-12-08 DIAGNOSIS — I10 PRIMARY HYPERTENSION: ICD-10-CM

## 2023-12-08 DIAGNOSIS — R73.9 HYPERGLYCEMIA: ICD-10-CM

## 2023-12-08 DIAGNOSIS — E78.1 HYPERTRIGLYCERIDEMIA: ICD-10-CM

## 2023-12-08 DIAGNOSIS — E03.8 SUBCLINICAL HYPOTHYROIDISM: ICD-10-CM

## 2023-12-08 DIAGNOSIS — F32.5 MAJOR DEPRESSIVE DISORDER WITH SINGLE EPISODE, IN FULL REMISSION (HCC): ICD-10-CM

## 2023-12-08 DIAGNOSIS — Z78.0 POSTMENOPAUSAL: ICD-10-CM

## 2023-12-08 DIAGNOSIS — I10 PRIMARY HYPERTENSION: Primary | ICD-10-CM

## 2023-12-08 DIAGNOSIS — M85.89 OSTEOPENIA OF MULTIPLE SITES: ICD-10-CM

## 2023-12-08 NOTE — ASSESSMENT & PLAN NOTE
Improved with change in season, but was not well-controlled during the winter on 30 mg dose of Celexa. Likely has SAD component. Feeling fine now, but will consider adding Wellbutrin XL if symptoms worsen.

## 2023-12-08 NOTE — ASSESSMENT & PLAN NOTE
Isabela Ent/OtolaryngologyH[address] [place] [address]    Phone:  [phone]    Fax:  [phone]       Thank You for choosing us for your health care visit. We are glad to serve you and happy to provide you with this summary of your visit. Please help us to ensure we have accurate records. If you find anything that needs to be changed, please let our staff know as soon as possible.          Your Demographic Information     Patient Name Sex Armaan Horton 1941       Ethnic Group Patient Race    Not of  or  Origin White      Your Visit Details     Date & Time Provider Department    2017 10:20 AM MD Isabela Jeff Ent/OtolaryngologyHilario Rojas Dr      We Ordered or Performed the Following     SERVICE TO DERMATOLOGY     SERVICE TO OPHTHALMOLOGY       Conditions Discussed Today or Order-Related Diagnoses        Comments    Entropion, left    -  Primary     Facial lesion           Your Vitals Were     BP Pulse Height Weight BMI Smoking Status    116/63 (BP Location: [facility], Patient Position: Sitting, Cuff Size: Regular) 58 6' 2\" (1.88 m) 179 lb 3.7 oz (81.3 kg) 23.01 kg/m2 Never Smoker      Medications Prescribed or Re-Ordered Today     None      Your Current Medications Are        Disp Refills Start End    Ascorbic Acid (VITAMIN C) 500 MG tablet        Sig - Route: Take 500 mg by mouth daily. Once daily as needed for symptoms of a cold - Oral    Class: Historical Med    carvedilol (COREG) 25 MG tablet 31 tablet 14 2017     Sig: TAKE (1/2) TABLET ORALLY TWICE DAILY WITH MEALS.    Class: Eprescribe    levETIRAcetam (KEPPRA) 500 MG tablet 180 tablet 3 2017     Sig - Route: Take 1 tablet by mouth 2 times daily. - Oral    Class: Eprescribe    hydrALAZINE (APRESOLINE) 25 MG tablet 93 tablet 3 2017     Sig: TAKE (1) TABLET ORALLY THREE TIMES DAILY.    Class: Eprescribe    risperiDONE (RISPERDAL) 1 MG tablet 78 tablet 14  Discussed importance of continued lifestyle changes to help prevent need for cholesterol medication in the future. 3/22/2017     Sig: TAKE (1/2) TABLET ORALLY ONCE DAILY IN THE MORNING. TAKE (2) TABLETS ORALLY ONCE DAILY AT BEDTIME.    Class: Eprescribe    risperiDONE (RISPERDAL) 0.5 MG tablet 14 tablet 11 3/22/2017     Si.5 mg daily, 3 mg at bedtime and 1 mg at bedtime as needed.    Class: Eprescribe    fluticasone (FLONASE) 50 MCG/ACT nasal spray 1 Bottle 11 2017     Sig - Route: Spray 2 sprays in each nostril daily. Two sprays each nostril - Nasal    sodium chloride (OCEAN NASAL SPRAY) 0.65 % nasal spray 30 mL 11 2017     Sig - Route: Spray 2 sprays in each nostril as needed for Congestion. - Nasal    Acetaminophen 325 MG Cap 60 capsule 2 2017     Sig - Route: Take 650 mg by mouth 4 times daily as needed (pain/fever). - Oral    Class: Eprescribe    citalopram (CELEXA) 40 MG tablet 42 tablet 14 2016     Sig: TAKE (1) & (1/2) TABLETS (60 MG) ORALLY IN THE MORNING.    Class: Eprescribe    aspirin (ECOTRIN) 325 MG EC tablet 28 tablet 14 2016     Sig: TAKE (1) TABLET ORALLY ONCE DAILY.    Class: Eprescribe    amLODIPine (NORVASC) 10 MG tablet 28 tablet 14 2016     Sig: TAKE (1) TABLET ORALLY ONCE DAILY IN THE MORNING.    Class: Eprescribe    Multiple Vitamin (MULTIVITAMIN) tablet 28 tablet 14 2016     Sig: TAKE (1) TABLET ORALLY ONCE DAILY.    Class: Eprescribe    sennosides-docusate sodium (SENOKOT-S) 8.6-50 MG tablet 112 tablet 14 2016     Sig: TAKE (2) TABLETS ORALLY TWICE DAILY.    Class: Eprescribe    loratadine (CLARITIN) 10 MG tablet 28 tablet 14 2016     Sig: TAKE (1) TABLET ORALLY ONCE DAILY AT BEDTIME.    Class: Eprescribe    hydrochlorothiazide (HYDRODIURIL) 25 MG tablet 28 tablet 14 2016     Sig: TAKE (1) TABLET ORALLY DAILY IN THE MORNING.    Class: Eprescribe    lisinopril (PRINIVIL,ZESTRIL) 40 MG tablet 28 tablet 14 2016     Sig: TAKE (1) TABLET ORALLY ONCE DAILY IN THE MORNING.    Class: Eprescribe    ranitidine (ZANTAC) 150 MG tablet 56 tablet 14  12/20/2016     Sig: TAKE (1) TABLET ORALLY TWICE DAILY.    Class: Eprescribe    finasteride (PROSCAR) 5 MG tablet 28 tablet 14 12/20/2016     Sig: TAKE (1) TABLET ORALLY ONCE DAILY **WEAR GLOVES WHEN ADMINISTERING**    Class: Eprescribe    atorvastatin (LIPITOR) 80 MG tablet 14 tablet 14 12/20/2016     Sig: TAKE 1/2 TABLET (40MG) ORALLY AT BEDTIME.    Class: Eprescribe    hydrALAZINE (APRESOLINE) 50 MG tablet        Sig - Route: Take 50 mg by mouth 3 times daily. - Oral    Class: Historical Med    Cholecalciferol (VITAMIN D3) 2000 UNITS Tab        Sig - Route: Take 1 tablet by mouth daily. - Oral    Class: Historical Med    potassium chloride (K-DUR,KLOR-CON) 10 MEQ CR tablet 176 tablet 10 8/30/2016     Sig - Route: Take 4 tablets by mouth 2 times daily. Take 4 tabs orally twice a day - Oral    Class: Eprescribe    Flunisolide 25 MCG/ACT (0.025%) SOLN 1 Bottle 1 1/6/2015     Sig - Route: Spray 25 mLs in each nostril 2 times daily. - Nasal    Class: Eprescribe    docusate sodium (COLACE) 50 MG capsule        Sig - Route: Take 50 mg by mouth 2 times daily. - Oral    Class: Historical Med    gemfibrozil (LOPID) 600 MG tablet        Sig - Route: Take 600 mg by mouth 2 times daily. - Oral    Class: Historical Med    Multiple Vitamins-Minerals (MULTIVITAMIN PO)        Sig - Route: Take  by mouth. - Oral    Class: Historical Med    polyethylene glycol (GLYCOLAX, MIRALAX) packet        Sig - Route: Take 17 g by mouth daily. - Oral    Class: Historical Med    tamsulosin (FLOMAX) 0.4 MG Cap 31 capsule 11 4/18/2017     Sig - Route: Take 1 capsule by mouth daily after a meal. - Oral    Class: Eprescribe    terazosin (HYTRIN) 10 MG capsule 28 capsule 14 12/21/2016     Sig: TAKE (1) CAPSULE ORALLY ONCE DAILY AT BEDTIME.    Class: Eprescribe    atenolol (TENORMIN) 100 MG tablet        Sig - Route: Take 100 mg by mouth daily. - Oral    Class: Historical Med    tolterodine (DETROL LA) 4 MG 24 hr capsule        Sig - Route: Take 4 mg  by mouth daily. - Oral    Class: Historical Med    traZODONE (DESYREL) 50 MG tablet        Sig - Route: Take 50 mg by mouth nightly. Take 1/2 tablet at bedtime. - Oral    Class: Historical Med      Allergies     Peanut Butter ANAPHYLAXIS      Immunizations History as of 6/21/2017     Name Date    Influenza 10/2/2014    Td:Adult type tetanus/diphtheria 6/6/2005      Problem List as of 6/21/2017     Depression    Psychosis    Essential hypertension    Hyperlipidemia    BPH associated with nocturia    Gastroesophageal reflux disease without esophagitis    Meningioma (CMS/HCC)    History of colonic polyps    Diverticular disease    Mitral valve regurgitation    Orthostatic proteinuria              Patient Instructions       At [facility], one important tool we use to improve our patient services is our Patient Survey.  Following your visit you may receive our survey in the mail.    Please take the time to complete the survey.    If your visit with us was great, we want to hear about it.    If we can improve, please let us know how.

## 2023-12-08 NOTE — ASSESSMENT & PLAN NOTE
Discussed importance of continued lifestyle changes to help prevent progression to diabetes. Discussed strategies to address nighttime snacking.

## 2023-12-09 LAB
ALBUMIN SERPL-MCNC: 4.6 G/DL (ref 3.4–5)
ALBUMIN/GLOB SERPL: 2 {RATIO} (ref 1.1–2.2)
ALP SERPL-CCNC: 57 U/L (ref 40–129)
ALT SERPL-CCNC: 16 U/L (ref 10–40)
ANION GAP SERPL CALCULATED.3IONS-SCNC: 9 MMOL/L (ref 3–16)
AST SERPL-CCNC: 22 U/L (ref 15–37)
BILIRUB SERPL-MCNC: 0.4 MG/DL (ref 0–1)
BUN SERPL-MCNC: 19 MG/DL (ref 7–20)
CALCIUM SERPL-MCNC: 9.1 MG/DL (ref 8.3–10.6)
CHLORIDE SERPL-SCNC: 99 MMOL/L (ref 99–110)
CHOLEST SERPL-MCNC: 167 MG/DL (ref 0–199)
CO2 SERPL-SCNC: 30 MMOL/L (ref 21–32)
CREAT SERPL-MCNC: 0.7 MG/DL (ref 0.6–1.2)
EST. AVERAGE GLUCOSE BLD GHB EST-MCNC: 122.6 MG/DL
GFR SERPLBLD CREATININE-BSD FMLA CKD-EPI: >60 ML/MIN/{1.73_M2}
GLUCOSE P FAST SERPL-MCNC: 105 MG/DL (ref 70–99)
HBA1C MFR BLD: 5.9 %
HDLC SERPL-MCNC: 56 MG/DL (ref 40–60)
LDL CHOLESTEROL CALCULATED: 85 MG/DL
POTASSIUM SERPL-SCNC: 4.1 MMOL/L (ref 3.5–5.1)
PROT SERPL-MCNC: 6.9 G/DL (ref 6.4–8.2)
SODIUM SERPL-SCNC: 138 MMOL/L (ref 136–145)
TRIGL SERPL-MCNC: 130 MG/DL (ref 0–150)
TSH SERPL DL<=0.005 MIU/L-ACNC: 3.61 UIU/ML (ref 0.27–4.2)
VLDLC SERPL CALC-MCNC: 26 MG/DL

## 2024-01-04 ENCOUNTER — HOSPITAL ENCOUNTER (OUTPATIENT)
Dept: MAMMOGRAPHY | Age: 74
Discharge: HOME | End: 2024-01-04
Payer: MEDICARE

## 2024-01-04 ENCOUNTER — HOSPITAL ENCOUNTER (OUTPATIENT)
Dept: GENERAL RADIOLOGY | Age: 74
Discharge: HOME | End: 2024-01-04
Payer: MEDICARE

## 2024-01-04 VITALS — HEIGHT: 65 IN | BODY MASS INDEX: 28.32 KG/M2 | WEIGHT: 170 LBS

## 2024-01-04 DIAGNOSIS — Z12.31 VISIT FOR SCREENING MAMMOGRAM: ICD-10-CM

## 2024-01-04 DIAGNOSIS — Z78.0 POSTMENOPAUSAL: ICD-10-CM

## 2024-01-04 PROCEDURE — 77080 DXA BONE DENSITY AXIAL: CPT

## 2024-01-04 PROCEDURE — 77063 BREAST TOMOSYNTHESIS BI: CPT

## 2024-02-11 RX ORDER — CITALOPRAM 20 MG/1
TABLET ORAL
Qty: 135 TABLET | Refills: 1 | Status: SHIPPED | OUTPATIENT
Start: 2024-02-11

## 2024-02-11 RX ORDER — LISINOPRIL AND HYDROCHLOROTHIAZIDE 12.5; 1 MG/1; MG/1
TABLET ORAL
Qty: 90 TABLET | Refills: 1 | Status: SHIPPED | OUTPATIENT
Start: 2024-02-11

## 2024-03-02 ENCOUNTER — PATIENT MESSAGE (OUTPATIENT)
Dept: INTERNAL MEDICINE CLINIC | Age: 74
End: 2024-03-02

## 2024-03-04 NOTE — TELEPHONE ENCOUNTER
Spoke with patient about options for continuity of care. She is also concerned about a suspicious lesion on her face- acute appointment scheduled for 3/15 at 3:00.

## 2024-03-04 NOTE — TELEPHONE ENCOUNTER
From: Tata Narvaez  To: Dr. Lizz Cummings  Sent: 3/2/2024 4:57 PM EST  Subject: Please call at your convenience.     Dr Cummings,   It’s Tata Narvaez, one of your patients. I read your letter that you are going on to a  group. Will you please call me as soon as you are able?     I’m sure you know I will miss you so much. I’m glad in my recent visit I told you how I feel about you being a very good Doctor.     Thanks  Shivam   [phone]

## 2024-03-13 NOTE — PROGRESS NOTES
Date of Visit:  3/15/2024    CC: Tata Narvaez (: 1950) is a 73 y.o. female, established patient, here for evaluation/re-evaluation of the following medical concerns:    ASSESSMENT/PLAN:  Multiple actinic keratoses  Assessment & Plan:  Appears to be a combination of actinic and seborrheic keratoses. Dermatology referral provided.         Return if symptoms worsen or fail to improve.     Vitals:    03/15/24 1459   BP: 130/78   Pulse: 80   SpO2: 97%   Weight: 78.9 kg (174 lb)   Height: 1.651 m (5' 5\")      Estimated body mass index is 28.96 kg/m² as calculated from the following:    Height as of this encounter: 1.651 m (5' 5\").    Weight as of this encounter: 78.9 kg (174 lb).     Wt Readings from Last 3 Encounters:   03/15/24 78.9 kg (174 lb)   24 77.1 kg (170 lb)   23 76.7 kg (169 lb)     BP Readings from Last 3 Encounters:   03/15/24 130/78   23 128/84   23 108/60     HPI  1 year history of NT, raised, scaly lesions on right lateral face, bilateral thighs and bilateral forearms. Not changing much with time. Does not have a dermatologist.    ROS  As documented above    Physical Exam  Constitutional:       General: She is not in acute distress.     Appearance: She is well-developed.   Skin:     Comments: Multiple raised, scaly lesions with variable pigmentation on right face, bilateral thighs, feet and forearms. No surrounding erythema, ulceration or pearly borders.    Neurological:      Mental Status: She is alert and oriented to person, place, and time.   Psychiatric:         Mood and Affect: Mood normal.         Behavior: Behavior normal.       On this date 3/15/2024 I have spent 20 minutes reviewing previous notes, test results and face to face with the patient discussing the diagnosis and importance of compliance with the treatment plan as well as documenting on the day of the visit.    --Lizz Cummings MD on 3/15/2024 at 3:34 PM    An electronic signature was used to

## 2024-03-15 ENCOUNTER — OFFICE VISIT (OUTPATIENT)
Dept: INTERNAL MEDICINE CLINIC | Age: 74
End: 2024-03-15

## 2024-03-15 VITALS
HEIGHT: 65 IN | WEIGHT: 174 LBS | BODY MASS INDEX: 28.99 KG/M2 | HEART RATE: 80 BPM | OXYGEN SATURATION: 97 % | SYSTOLIC BLOOD PRESSURE: 130 MMHG | DIASTOLIC BLOOD PRESSURE: 78 MMHG

## 2024-03-15 DIAGNOSIS — L57.0 MULTIPLE ACTINIC KERATOSES: Primary | ICD-10-CM

## 2024-03-15 PROBLEM — F33.1 MAJOR DEPRESSIVE DISORDER, RECURRENT, MODERATE (HCC): Status: ACTIVE | Noted: 2024-03-15

## 2024-03-15 PROBLEM — F33.9 MAJOR DEPRESSIVE DISORDER, RECURRENT, UNSPECIFIED (HCC): Status: ACTIVE | Noted: 2024-03-15

## 2024-03-15 PROBLEM — F33.0 MAJOR DEPRESSIVE DISORDER, RECURRENT, MILD (HCC): Status: ACTIVE | Noted: 2024-03-15

## 2024-03-15 ASSESSMENT — PATIENT HEALTH QUESTIONNAIRE - PHQ9
SUM OF ALL RESPONSES TO PHQ QUESTIONS 1-9: 1
SUM OF ALL RESPONSES TO PHQ QUESTIONS 1-9: 1
4. FEELING TIRED OR HAVING LITTLE ENERGY: 0
3. TROUBLE FALLING OR STAYING ASLEEP: 0
SUM OF ALL RESPONSES TO PHQ QUESTIONS 1-9: 1
SUM OF ALL RESPONSES TO PHQ9 QUESTIONS 1 & 2: 0
10. IF YOU CHECKED OFF ANY PROBLEMS, HOW DIFFICULT HAVE THESE PROBLEMS MADE IT FOR YOU TO DO YOUR WORK, TAKE CARE OF THINGS AT HOME, OR GET ALONG WITH OTHER PEOPLE: 0
9. THOUGHTS THAT YOU WOULD BE BETTER OFF DEAD, OR OF HURTING YOURSELF: 0
6. FEELING BAD ABOUT YOURSELF - OR THAT YOU ARE A FAILURE OR HAVE LET YOURSELF OR YOUR FAMILY DOWN: 0
5. POOR APPETITE OR OVEREATING: 1
7. TROUBLE CONCENTRATING ON THINGS, SUCH AS READING THE NEWSPAPER OR WATCHING TELEVISION: 0
1. LITTLE INTEREST OR PLEASURE IN DOING THINGS: 0
8. MOVING OR SPEAKING SO SLOWLY THAT OTHER PEOPLE COULD HAVE NOTICED. OR THE OPPOSITE, BEING SO FIGETY OR RESTLESS THAT YOU HAVE BEEN MOVING AROUND A LOT MORE THAN USUAL: 0
SUM OF ALL RESPONSES TO PHQ QUESTIONS 1-9: 1
2. FEELING DOWN, DEPRESSED OR HOPELESS: 0

## 2024-03-15 NOTE — PATIENT INSTRUCTIONS
Dermatologists:    [phone]  [facility]             [phone]  Chey                         [phone]  Skip                           [phone]    Valerie. MD Bunny (Skip)  [phone]    Willy Mckenna MD (Richard Weinstein)  [phone]    Juanito Canseco MD (Enrrique Weinstein)  [phone]

## 2024-04-08 ENCOUNTER — OFFICE VISIT (OUTPATIENT)
Dept: INTERNAL MEDICINE CLINIC | Age: 74
End: 2024-04-08
Payer: MEDICARE

## 2024-04-08 VITALS
BODY MASS INDEX: 28.56 KG/M2 | DIASTOLIC BLOOD PRESSURE: 82 MMHG | SYSTOLIC BLOOD PRESSURE: 130 MMHG | WEIGHT: 171.6 LBS | HEART RATE: 71 BPM | OXYGEN SATURATION: 95 % | TEMPERATURE: 97.3 F

## 2024-04-08 DIAGNOSIS — I10 PRIMARY HYPERTENSION: ICD-10-CM

## 2024-04-08 DIAGNOSIS — F32.5 MAJOR DEPRESSIVE DISORDER WITH SINGLE EPISODE, IN FULL REMISSION (HCC): ICD-10-CM

## 2024-04-08 DIAGNOSIS — L98.9 SKIN LESION: Primary | ICD-10-CM

## 2024-04-08 PROCEDURE — 3079F DIAST BP 80-89 MM HG: CPT | Performed by: STUDENT IN AN ORGANIZED HEALTH CARE EDUCATION/TRAINING PROGRAM

## 2024-04-08 PROCEDURE — 1123F ACP DISCUSS/DSCN MKR DOCD: CPT | Performed by: STUDENT IN AN ORGANIZED HEALTH CARE EDUCATION/TRAINING PROGRAM

## 2024-04-08 PROCEDURE — 99213 OFFICE O/P EST LOW 20 MIN: CPT | Performed by: STUDENT IN AN ORGANIZED HEALTH CARE EDUCATION/TRAINING PROGRAM

## 2024-04-08 PROCEDURE — 3075F SYST BP GE 130 - 139MM HG: CPT | Performed by: STUDENT IN AN ORGANIZED HEALTH CARE EDUCATION/TRAINING PROGRAM

## 2024-04-08 ASSESSMENT — ENCOUNTER SYMPTOMS
CONSTIPATION: 0
VOMITING: 0
EYE PAIN: 0
ABDOMINAL PAIN: 0
DIARRHEA: 0
SHORTNESS OF BREATH: 0
CHEST TIGHTNESS: 0
SINUS PAIN: 0

## 2024-04-08 NOTE — ASSESSMENT & PLAN NOTE
Patient has lesion on L forearm, appears to be actinic keratosis. Also has R cheek lesion that is itchy but improving.  - referred to dermatology

## 2024-04-08 NOTE — ASSESSMENT & PLAN NOTE
This problem is well controlled on current regimen. Patient reports good mood, just some loneliness.  - continue celexa

## 2024-04-08 NOTE — PROGRESS NOTES
2024    Tata Narvaez (:  1950) is a 73 y.o. female, here for evaluation of the following medical concerns:    Chief Complaint   Patient presents with    New Patient     Establishing care , seeking new provider & patient has concerns of spots on face due to sun exposure         HPI    Patient is a previous patient of Dr. Cummings. She is here to establish care. She has seasonal allergies. They are mostly controlled with her current OTC regimen.    She has a network of support here, even though her son lives out in [place]. She gets together with friends regularly and tries to keep busy.    She walks 1 mile 3 times a week. She tries to eat healthy, but she says she needs to eat more vegetables. She eats poultry, seafood. She used to swim but doesn't like going to the gym anymore.      Review of Systems   Constitutional:  Negative for fatigue and unexpected weight change.   HENT:  Negative for hearing loss and sinus pain.    Eyes:  Negative for pain.   Respiratory:  Negative for chest tightness and shortness of breath.    Cardiovascular:  Negative for chest pain and palpitations.   Gastrointestinal:  Negative for abdominal pain, constipation, diarrhea and vomiting.   Genitourinary:  Negative for difficulty urinating and vaginal bleeding.   Musculoskeletal:  Negative for myalgias.   Neurological:  Negative for dizziness and weakness.   Psychiatric/Behavioral:  Negative for dysphoric mood.        Prior to Visit Medications    Medication Sig Taking? Authorizing Provider   PSYLLIUM PO Take by mouth Powder Yes Maximiliano Lai MD   citalopram (CELEXA) 20 MG tablet TAKE 1 AND 1/2 TABLET BY MOUTH DAILY Yes Lizz Cummings MD   lisinopril-hydroCHLOROthiazide (PRINZIDE;ZESTORETIC) 10-12.5 MG per tablet TAKE 1 TABLET BY MOUTH DAILY Yes Lizz Cummings MD   turmeric 500 MG CAPS Take 1 capsule by mouth daily Yes Maximiliano Lai MD ASHWAGANDHA PO Take by mouth Yes Maximiliano Lai MD

## 2024-04-19 ENCOUNTER — CARE COORDINATION (OUTPATIENT)
Dept: CARE COORDINATION | Age: 74
End: 2024-04-19

## 2024-04-19 NOTE — CARE COORDINATION
Ambulatory Care Coordination Note     4/19/2024 10:18 AM     Patient Current Location:  Home: [address] [place] [id number]     patient  outreach attempt by this ACM today to offer care management services. ACM was unable to reach the patient  by telephone today; left voice message requesting a return phone call to this ACM.      ACM received in basket msg from PCP, \"Clarice Simmons MD [place], Teagan Mcfarland, BILL Candelaria,  This patient is unable to find a dermatologist with medicaid. Are you able to help her? Thanks!\"      Care Coordination Program opened at this time, ACM added to Tx Team. Next outreach in one week.      Teagan WOODWARD, RN  Ambulatory Care Manager  [email or web address]  [phone]

## 2024-04-25 ENCOUNTER — CARE COORDINATION (OUTPATIENT)
Dept: CARE COORDINATION | Age: 74
End: 2024-04-25

## 2024-04-25 SDOH — ECONOMIC STABILITY: FOOD INSECURITY: WITHIN THE PAST 12 MONTHS, YOU WORRIED THAT YOUR FOOD WOULD RUN OUT BEFORE YOU GOT MONEY TO BUY MORE.: NEVER TRUE

## 2024-04-25 SDOH — ECONOMIC STABILITY: INCOME INSECURITY: IN THE LAST 12 MONTHS, WAS THERE A TIME WHEN YOU WERE NOT ABLE TO PAY THE MORTGAGE OR RENT ON TIME?: NO

## 2024-04-25 SDOH — ECONOMIC STABILITY: HOUSING INSECURITY: IN THE LAST 12 MONTHS, HOW MANY PLACES HAVE YOU LIVED?: 1

## 2024-04-25 SDOH — ECONOMIC STABILITY: INCOME INSECURITY: HOW HARD IS IT FOR YOU TO PAY FOR THE VERY BASICS LIKE FOOD, HOUSING, MEDICAL CARE, AND HEATING?: NOT HARD AT ALL

## 2024-04-25 SDOH — HEALTH STABILITY: PHYSICAL HEALTH: ON AVERAGE, HOW MANY MINUTES DO YOU ENGAGE IN EXERCISE AT THIS LEVEL?: 30 MIN

## 2024-04-25 SDOH — ECONOMIC STABILITY: FOOD INSECURITY: WITHIN THE PAST 12 MONTHS, THE FOOD YOU BOUGHT JUST DIDN'T LAST AND YOU DIDN'T HAVE MONEY TO GET MORE.: NEVER TRUE

## 2024-04-25 SDOH — HEALTH STABILITY: MENTAL HEALTH
STRESS IS WHEN SOMEONE FEELS TENSE, NERVOUS, ANXIOUS, OR CAN'T SLEEP AT NIGHT BECAUSE THEIR MIND IS TROUBLED. HOW STRESSED ARE YOU?: NOT AT ALL

## 2024-04-25 SDOH — HEALTH STABILITY: PHYSICAL HEALTH: ON AVERAGE, HOW MANY DAYS PER WEEK DO YOU ENGAGE IN MODERATE TO STRENUOUS EXERCISE (LIKE A BRISK WALK)?: 7 DAYS

## 2024-04-25 ASSESSMENT — SOCIAL DETERMINANTS OF HEALTH (SDOH)
HOW OFTEN DO YOU ATTEND CHURCH OR RELIGIOUS SERVICES?: 1 TO 4 TIMES PER YEAR
DO YOU BELONG TO ANY CLUBS OR ORGANIZATIONS SUCH AS CHURCH GROUPS UNIONS, FRATERNAL OR ATHLETIC GROUPS, OR SCHOOL GROUPS?: YES
HOW OFTEN DO YOU GET TOGETHER WITH FRIENDS OR RELATIVES?: MORE THAN THREE TIMES A WEEK
IN A TYPICAL WEEK, HOW MANY TIMES DO YOU TALK ON THE PHONE WITH FAMILY, FRIENDS, OR NEIGHBORS?: MORE THAN THREE TIMES A WEEK
HOW OFTEN DO YOU ATTENT MEETINGS OF THE CLUB OR ORGANIZATION YOU BELONG TO?: 1 TO 4 TIMES PER YEAR

## 2024-04-25 NOTE — CARE COORDINATION
Dermatologists in network on [employer]'s Find a Provider site near pt's residence are:    [facility] ([facility])    In Network  [address]  [address]  [place] [address]    Phone [phone]    [facility]    In Network   [address]  [address]  [place] [address]   Phone [phone]    [facility]    In Network  [address] [place] [address]   Phone [phone]    [facility]     In Network  [address]  [address]  [place] [address]   Phone [phone]    [facility]    In Network  [address]  [address]  [place] [address]   Phone [phone]    [facility]    In Network  [address]  [place] [address]   Phone [phone]    [facility]    In Network  [address]  [address]  [place] [address]   Phone [phone]    [facility]    In Network  [address] [place] [address]   Phone [phone]    [facility]    In Network  [address] [place] [address]   Phone [phone]

## 2024-04-25 NOTE — CARE COORDINATION
exercise) that are impacting on physical or mental well-being?: No identified areas of concern   Do you have any other concerns about your patient’s mental well-being? How would you rate their severity and impact on the patient?: No identified areas of concern   How would you rate their home environment in terms of safety and stability (including domestic violence, insecure housing, neighbor harassment)?: Consistently safe, supportive, stable, no identified problems   How do daily activities impact on the patient's well-being? (include current or anticipated unemployment, work, caregiving, access to transportation or other): No identified problems or perceived positive benefits   How would you rate their social network (family, work, friends)?: Good participation with social networks   How would you rate their financial resources (including ability to afford all required medical care)?: Financially secure, resources adequate, no identified problems   How wells does the patient now understand their health and well-being (symptoms, signs or risk factors) and what they need to do to manage their health?: Reasonable to good understanding and already engages in managing health or is willing to undertake better management   How well do you think your patient can engage in healthcare discussions? (Barriers include language, deafness, aphasia, alcohol or drug problems, learning difficulties, concentration): Clear and open communication, no identified barriers   Do other services need to be involved to help this patient?: Other care/services not required at this time   Are current services involved with this patient well-coordinated? (Include coordination with other services you are now recommendation): All required care/services in place and well-coordinated   Suggested Interventions and Community Resources               ,   Hypertension - Encounter Level          ,   General Assessment          , and   Social Determinants

## 2024-04-26 ENCOUNTER — CARE COORDINATION (OUTPATIENT)
Dept: CARE COORDINATION | Age: 74
End: 2024-04-26

## 2024-04-26 NOTE — CARE COORDINATION
ACM/RN received in basket msg from [facility] with Dermatologist in network with pts payor.  Copied and placed into [employer] message for pt to review today.      Teagan WOODWARD, RN  Ambulatory Care Manager  [email or web address]  [phone]  '

## 2024-04-29 ENCOUNTER — CARE COORDINATION (OUTPATIENT)
Dept: CARE COORDINATION | Age: 74
End: 2024-04-29

## 2024-04-29 NOTE — CARE COORDINATION
Attempt to initiate SW referral to confirm COA enrollment and AD. Unable to be reached. Left detailed message. AD forms will be mailed 4/30. SW will f/u.

## 2024-05-02 ENCOUNTER — CARE COORDINATION (OUTPATIENT)
Dept: CARE COORDINATION | Age: 74
End: 2024-05-02

## 2024-05-10 ENCOUNTER — CARE COORDINATION (OUTPATIENT)
Dept: CARE COORDINATION | Age: 74
End: 2024-05-10

## 2024-05-20 ENCOUNTER — CARE COORDINATION (OUTPATIENT)
Dept: CARE COORDINATION | Age: 74
End: 2024-05-20

## 2024-05-20 NOTE — CARE COORDINATION
Patient has graduated from the Complex Case Management  program on 5/20/24.  Patient/family has the ability to self-manage at this time.  Care management goals have been completed. No further Ambulatory Care Manager follow up scheduled.     Goals Addressed                   This Visit's Progress     COMPLETED: Conditions and Symptoms   On track     I will notify my provider of any symptoms that indicate a worsening of my condition.    Barriers: fear of failure, stress, medication side effects, and lack of education  Plan for overcoming my barriers: Work with [facility]  Confidence: 9/10  Anticipated Goal Completion Date: 6/15/24                Patient has Ambulatory Care Manager's contact information for any further questions, concerns, or needs.  Patients upcoming visits:    Future Appointments   Date Time Provider Department Center   10/9/2024  2:00 PM Clarice Simmons MD [facility] [address] [facility]       Teagan WOODWARD, RN  Ambulatory Care Manager  [email or web address]  [phone]

## 2024-05-30 ENCOUNTER — TELEPHONE (OUTPATIENT)
Dept: INTERNAL MEDICINE CLINIC | Age: 74
End: 2024-05-30

## 2024-05-30 NOTE — TELEPHONE ENCOUNTER
Pt has painful, itchy, and burning red clusters on R arm from wrist to under arm along chest and down toward navel area.     Has been experiencing these symptoms Pt is wanting to know if there is a Rx she needs or if she needs to be seen for an appt.     Please call and advise pt or send Rx into the pharmacy.     [facility], located within [facility] [id number] [place], [place] - [address] [facility] [phone] - F [phone] [[id number]]

## 2024-05-31 NOTE — TELEPHONE ENCOUNTER
Per Dr. Bustamante pt should go to Urgent to be evaluated.    Patient advised and states it has been 10 days and it crusted over but still would jeremy to be seen. Does not want to go to urgent care will like an appt instead. Appt made with ALEXX Crooks.

## 2024-06-03 ENCOUNTER — OFFICE VISIT (OUTPATIENT)
Dept: FAMILY MEDICINE CLINIC | Age: 74
End: 2024-06-03
Payer: MEDICARE

## 2024-06-03 VITALS
BODY MASS INDEX: 28.96 KG/M2 | DIASTOLIC BLOOD PRESSURE: 84 MMHG | WEIGHT: 174 LBS | OXYGEN SATURATION: 97 % | TEMPERATURE: 96.9 F | SYSTOLIC BLOOD PRESSURE: 124 MMHG | HEART RATE: 69 BPM

## 2024-06-03 DIAGNOSIS — M19.042 ARTHRITIS OF BOTH HANDS: ICD-10-CM

## 2024-06-03 DIAGNOSIS — M19.041 ARTHRITIS OF BOTH HANDS: ICD-10-CM

## 2024-06-03 DIAGNOSIS — I10 PRIMARY HYPERTENSION: ICD-10-CM

## 2024-06-03 DIAGNOSIS — L24.7 IRRITANT CONTACT DERMATITIS DUE TO PLANTS, EXCEPT FOOD: Primary | ICD-10-CM

## 2024-06-03 PROCEDURE — 3079F DIAST BP 80-89 MM HG: CPT | Performed by: NURSE PRACTITIONER

## 2024-06-03 PROCEDURE — 99214 OFFICE O/P EST MOD 30 MIN: CPT | Performed by: NURSE PRACTITIONER

## 2024-06-03 PROCEDURE — 1123F ACP DISCUSS/DSCN MKR DOCD: CPT | Performed by: NURSE PRACTITIONER

## 2024-06-03 PROCEDURE — 3074F SYST BP LT 130 MM HG: CPT | Performed by: NURSE PRACTITIONER

## 2024-06-03 RX ORDER — TRIAMCINOLONE ACETONIDE 0.25 MG/G
CREAM TOPICAL
Qty: 80 G | Refills: 0 | Status: SHIPPED | OUTPATIENT
Start: 2024-06-03

## 2024-06-03 NOTE — PROGRESS NOTES
triamcinolone (KENALOG) 0.025 % cream Apply topically 2 times daily. 80 g 0    PSYLLIUM PO Take by mouth Powder      citalopram (CELEXA) 20 MG tablet TAKE 1 AND 1/2 TABLET BY MOUTH DAILY 135 tablet 1    lisinopril-hydroCHLOROthiazide (PRINZIDE;ZESTORETIC) 10-12.5 MG per tablet TAKE 1 TABLET BY MOUTH DAILY 90 tablet 1    turmeric 500 MG CAPS Take 1 capsule by mouth daily      ASHWAGANDHA PO Take by mouth      Saccharomyces boulardii (FLORASTOR PO) Take 1 caplet by mouth daily      FLUTICASONE PROPIONATE NA by Nasal route      BIOTIN PO Take by mouth      vitamin E 400 UNIT capsule Take 1 capsule by mouth daily      Cholecalciferol (VITAMIN D3) 2000 units CAPS Take by mouth      Coenzyme Q10 (CO Q 10) 60 MG CAPS Take by mouth      potassium chloride (KLOR-CON) 20 MEQ packet Take 20 mEq by mouth daily      Omega-3 Fatty Acids (FISH OIL) 1000 MG CPDR Take 1 capsule by mouth daily (Patient not taking: Reported on 4/25/2024)       No current facility-administered medications for this visit.     MEDICATION LIST REVIEWED AND UPDATED AT TIME OF VISIT    Review of Systems   All other systems reviewed and are negative.      Vitals:    06/03/24 1041   BP: 124/84   Pulse: 69   Temp: 96.9 °F (36.1 °C)   SpO2: 97%   Weight: 78.9 kg (174 lb)       Physical Exam  Constitutional:       Appearance: Normal appearance. She is well-developed and normal weight.   HENT:      Head: Normocephalic.      Right Ear: Tympanic membrane normal.      Left Ear: Tympanic membrane normal.      Mouth/Throat:      Mouth: Mucous membranes are moist.   Eyes:      Pupils: Pupils are equal, round, and reactive to light.   Cardiovascular:      Rate and Rhythm: Normal rate and regular rhythm.      Heart sounds: Normal heart sounds.   Pulmonary:      Effort: Pulmonary effort is normal.      Breath sounds: Normal breath sounds.   Musculoskeletal:         General: Normal range of motion.      Cervical back: Normal range of motion.   Skin:     General: Skin is

## 2024-06-03 NOTE — ASSESSMENT & PLAN NOTE
New problem-acute.  Rash more consistent with dermatitis from poison ivy or plant oils then with zoster.  Will give topical Kenalog cream to use twice daily for the next week until rash resolves.  Encourage patient to make certain the close that she had been wearing a very well cleaned.  Future yardwork should be wearing long sleeves, gloves, long pants tucked into the socks.  Some sort of facial protection.

## 2024-06-03 NOTE — ASSESSMENT & PLAN NOTE
Chronic-well-controlled.  Patient on lisinopril hydrochlorothiazide with good relief.  For this reason I discouraged use of anti-inflammatory NSAIDs unless absolutely necessary for discomfort in her hands or arthritis.

## 2024-06-03 NOTE — ASSESSMENT & PLAN NOTE
Chronic-not well-controlled.  Encouraged use of topical Voltaren gel 3 times a day and all tender bony areas of her hands.  May take Tylenol as necessary if pain becomes worse.  Discouraged use of anti-inflammatory unless necessary due to its renal side effects.

## 2024-07-02 ENCOUNTER — TELEPHONE (OUTPATIENT)
Dept: INTERNAL MEDICINE CLINIC | Age: 74
End: 2024-07-02

## 2024-07-02 DIAGNOSIS — Z12.11 SCREENING FOR COLON CANCER: Primary | ICD-10-CM

## 2024-07-02 NOTE — TELEPHONE ENCOUNTER
Pt would like a referral for a Colonoscopy.  She states she hasn't had one for 25 years.     Please advise pt at:  [phone]

## 2024-07-31 RX ORDER — CITALOPRAM 20 MG/1
TABLET ORAL
Qty: 135 TABLET | Refills: 1 | Status: SHIPPED | OUTPATIENT
Start: 2024-07-31

## 2024-07-31 RX ORDER — LISINOPRIL AND HYDROCHLOROTHIAZIDE 12.5; 1 MG/1; MG/1
TABLET ORAL
Qty: 90 TABLET | Refills: 1 | Status: SHIPPED | OUTPATIENT
Start: 2024-07-31

## 2024-07-31 NOTE — TELEPHONE ENCOUNTER
REFILL    lisinopril-hydroCHLOROthiazide (PRINZIDE;ZESTORETIC) 10-12.5 MG per tablet     citalopram (CELEXA) 20 MG tablet     [facility] [id number] [facility] [address] [facility] [phone] -  [phone]

## 2024-08-30 ENCOUNTER — HOSPITAL ENCOUNTER (OUTPATIENT)
Age: 74
Setting detail: OUTPATIENT SURGERY
Discharge: HOME | End: 2024-08-30
Attending: INTERNAL MEDICINE | Admitting: INTERNAL MEDICINE
Payer: MEDICARE

## 2024-08-30 ENCOUNTER — TELEPHONE (OUTPATIENT)
Dept: INTERNAL MEDICINE CLINIC | Age: 74
End: 2024-08-30

## 2024-08-30 ENCOUNTER — ANESTHESIA (OUTPATIENT)
Dept: ENDOSCOPY | Age: 74
End: 2024-08-30
Payer: MEDICARE

## 2024-08-30 ENCOUNTER — ANESTHESIA EVENT (OUTPATIENT)
Dept: ENDOSCOPY | Age: 74
End: 2024-08-30
Payer: MEDICARE

## 2024-08-30 VITALS
RESPIRATION RATE: 14 BRPM | WEIGHT: 171 LBS | TEMPERATURE: 96.9 F | HEART RATE: 64 BPM | BODY MASS INDEX: 28.49 KG/M2 | SYSTOLIC BLOOD PRESSURE: 120 MMHG | DIASTOLIC BLOOD PRESSURE: 76 MMHG | OXYGEN SATURATION: 98 % | HEIGHT: 65 IN

## 2024-08-30 DIAGNOSIS — Z12.11 SCREEN FOR COLON CANCER: ICD-10-CM

## 2024-08-30 LAB
EKG DIAGNOSIS: NORMAL
EKG Q-T INTERVAL: 428 MS
EKG QRS DURATION: 132 MS
EKG QTC CALCULATION (BAZETT): 455 MS
EKG R AXIS: -66 DEGREES
EKG T AXIS: -14 DEGREES
EKG VENTRICULAR RATE: 68 BPM

## 2024-08-30 PROCEDURE — 2709999900 HC NON-CHARGEABLE SUPPLY: Performed by: INTERNAL MEDICINE

## 2024-08-30 PROCEDURE — 88305 TISSUE EXAM BY PATHOLOGIST: CPT

## 2024-08-30 PROCEDURE — 7100000010 HC PHASE II RECOVERY - FIRST 15 MIN: Performed by: INTERNAL MEDICINE

## 2024-08-30 PROCEDURE — 6360000002 HC RX W HCPCS: Performed by: NURSE ANESTHETIST, CERTIFIED REGISTERED

## 2024-08-30 PROCEDURE — 3609010600 HC COLONOSCOPY POLYPECTOMY SNARE/COLD BIOPSY: Performed by: INTERNAL MEDICINE

## 2024-08-30 PROCEDURE — 2500000003 HC RX 250 WO HCPCS: Performed by: NURSE ANESTHETIST, CERTIFIED REGISTERED

## 2024-08-30 PROCEDURE — 3700000000 HC ANESTHESIA ATTENDED CARE: Performed by: INTERNAL MEDICINE

## 2024-08-30 PROCEDURE — 3700000001 HC ADD 15 MINUTES (ANESTHESIA): Performed by: INTERNAL MEDICINE

## 2024-08-30 PROCEDURE — 7100000011 HC PHASE II RECOVERY - ADDTL 15 MIN: Performed by: INTERNAL MEDICINE

## 2024-08-30 PROCEDURE — 2580000003 HC RX 258: Performed by: ANESTHESIOLOGY

## 2024-08-30 RX ORDER — LIDOCAINE HYDROCHLORIDE 20 MG/ML
INJECTION, SOLUTION EPIDURAL; INFILTRATION; INTRACAUDAL; PERINEURAL PRN
Status: DISCONTINUED | OUTPATIENT
Start: 2024-08-30 | End: 2024-08-30 | Stop reason: SDUPTHER

## 2024-08-30 RX ORDER — SODIUM CHLORIDE, SODIUM LACTATE, POTASSIUM CHLORIDE, CALCIUM CHLORIDE 600; 310; 30; 20 MG/100ML; MG/100ML; MG/100ML; MG/100ML
INJECTION, SOLUTION INTRAVENOUS CONTINUOUS
Status: DISCONTINUED | OUTPATIENT
Start: 2024-08-30 | End: 2024-08-30 | Stop reason: HOSPADM

## 2024-08-30 RX ORDER — PROPOFOL 10 MG/ML
INJECTION, EMULSION INTRAVENOUS PRN
Status: DISCONTINUED | OUTPATIENT
Start: 2024-08-30 | End: 2024-08-30 | Stop reason: SDUPTHER

## 2024-08-30 RX ADMIN — PROPOFOL 100 MCG/KG/MIN: 10 INJECTION, EMULSION INTRAVENOUS at 08:43

## 2024-08-30 RX ADMIN — SODIUM CHLORIDE, POTASSIUM CHLORIDE, SODIUM LACTATE AND CALCIUM CHLORIDE: 600; 310; 30; 20 INJECTION, SOLUTION INTRAVENOUS at 08:08

## 2024-08-30 RX ADMIN — LIDOCAINE HYDROCHLORIDE 80 MG: 20 INJECTION, SOLUTION EPIDURAL; INFILTRATION; INTRACAUDAL; PERINEURAL at 08:42

## 2024-08-30 RX ADMIN — PROPOFOL 50 MG: 10 INJECTION, EMULSION INTRAVENOUS at 08:42

## 2024-08-30 ASSESSMENT — PAIN SCALES - GENERAL: PAINLEVEL_OUTOF10: 0

## 2024-08-30 NOTE — PROCEDURES
[facility]  Colonoscopy Note    Patient: Tata Narvaez  : 1950  Acct#:     Procedure: Colonoscopy with polypectomy (cold snare)    Date:  2024    Surgeon:  JEANINE EDMONDS MD      Anesthesia: IV propofol, per anesthesia    EBL: <50 mL    Indications: personal history of colon polyps with negative serial Cologuard (last one negative 6 months ago)    Procedure:     An informed consent was obtained from the patient after explanation of indications, benefits, possible risks and complications of the procedure.  The patient was then taken to the endoscopy suite, placed in the left lateral decubitus position, and the above IV anesthesia was administered.    A digital rectal examination was performed and revealed negative without mass, lesions or tenderness.      The Olympus PCFQ-H190 video colonoscope was placed in the patient's rectum under digital direction and advanced to the cecum. The cecum was identified by characteristic anatomy and ballottment.  The prep was adequate.      Findings:  Diverticulosis  A 5 mm sessile polyp in the ascending colon removed with cold snare polypectomy  5 mm sessile polyp in the transverse colon removed via cold snare polypectomy.      The scope was then withdrawn into the rectum and retroflexed.  The retroflexed view of the anal verge and rectum demonstrates medium hemorrhoids.     The scope was straightened, the colon was decompressed and the scope was withdrawn from the patient.      The patient tolerated the procedure well and was taken to the PACU in good condition.    Biopsies:  yes      Impression:   Diverticulosis  A 5 mm sessile polyp in the ascending colon removed with cold snare polypectomy  5 mm sessile polyp in the transverse colon removed via cold snare polypectomy.    Recommendations:  Await pathology results.      If the pathology is consistent with precancerous polyps, note that this is a false negative for Cologuard which  is quite common.  Cologuard misses nearly 10% of all colon cancers, 60% of all high risk polyps which are likely to become colon cancer, 80% of all polyp lesions and has a 1 and 7 false-positive rate.  Additionally, it is contraindicated in patients with a history of colon polyps.    Da Lynn MD  [facility]
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
## 2024-08-30 NOTE — PROGRESS NOTES
Ambulatory Surgery/Procedure Discharge Note    Vitals:    08/30/24 0945   BP: 120/76   Pulse: 64   Resp: 14   Temp:    SpO2: 98%       No intake/output data recorded.    Restroom use offered before discharge.  Yes    Pain assessment:  none  Pain Level: 0    Noted patient in new onset afib. Obtained 12 lead EKG. Made Dr. Mckenzie aware. Patient HR controlled 60-70s and asymptomatic. Educated patient of signs and symptoms of when to come to hospital if patient would go into afib RVR. Dr. Mckenzie spoke with patient and patient to make appointment with primary doctor ASAP, and to start taking a baby aspirin every day. Patient stated understanding.     Patient has been seen by doctor. Discharge order obtained, and discharge instructions reviewed. Patient educated, using the teach back method, about follow up instructions and discharge instructions. A completed copy of the AVS instructions given to patient and all questions answered. IV catheter removed without complaints, catheter intact, site WNL. Patient discharged to home/self care. Patient discharged via wheel chair by transporter to waiting family/S.O.       8/30/2024 10:51 AM

## 2024-08-30 NOTE — TELEPHONE ENCOUNTER
Eric Guerra from [facility] said pt just received Colonoscopy at [facility]. Pt is in A fib no history, she is fine sending home and follow up with Dr. Simmons .Advise to take 1 Asprin a day.    Dr. Hernandez does not need call back pt will call to make appt.

## 2024-08-30 NOTE — DISCHARGE INSTRUCTIONS
COLONOSCOPY DISCHARGE INSTRUCTIONS:    Call the physician that did your procedure for any questions or concern:    [facility]: [phone]  DR. JEANINE EDMONDS      ACTIVITY:    There are potential side effects to the medications used for sedation and anesthesia during your procedure.  These include:  Dizziness or light-headedness, confusion or memory loss, delayed reaction times, loss of coordination, nausea and vomiting.  Because of your increased risk for injury, we ask that you observe the following precautions:  For the next 24 hours,  DO NOT operate an automobile, bicycle, motorcycle, , power tools or large equipment of any kind.  Do not drink alcohol, sign any legal documents or make any legal decisions for 24 hours.  Do not bend your head over lower than your heart.  DO sit on the side of bed/couch awhile before getting up.  Plan on bedrest or quiet relaxation today.  You may resume normal activities in 24 hours.    DIET:    Your first meal today should be light, avoiding spicy and fatty foods.  If you tolerate this first meal, then you may advance to your regular diet unless otherwise advised by your physician.    NORMAL SYMPTOMS:  -Mild sore throat if you’ve had an EGD   -Gaseous discomfort    NOTIFY YOUR PHYSICIAN IF THESE SYMPTOMS OCCUR:  1. Fever (greater than 100)  5. Increased abdominal bloating  2. Severe pain    6. Excessive bleeding  3. Nausea and vomiting  7. Chest pain                                                                    4. Chills    8. Shortness of breath    ADDITIONAL INSTRUCTIONS:    Biopsy results: Call [facility] for biopsy results in 1 week    Educational Information:          Please review these discharge instructions this evening or tomorrow for  information you may have forgotten.            We want to thank you for choosing the [facility] as your health care provider. We always strive to provide you with excellent care while you are  here. You may receive a survey in the mail regarding your care. We would appreciate you taking a few minutes of your time to complete this survey.
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
## 2024-08-30 NOTE — ANESTHESIA POSTPROCEDURE EVALUATION
Department of Anesthesiology  Postprocedure Note    Patient: Tata Narvaez  MRN: [id number]  YOB: 1950  Date of evaluation: 8/30/2024    Procedure Summary       Date: 08/30/24 Room / Location: [place] [address] / [facility]    Anesthesia Start: 0838 Anesthesia Stop: 0902    Procedure: COLONOSCOPY POLYPECTOMY SNARE/BIOPSY Diagnosis:       Screen for colon cancer      (Screen for colon cancer [Z12.11])    Surgeons: Da Lynn MD Responsible Provider: Eric Mckenzie MD    Anesthesia Type: MAC ASA Status: 2            Anesthesia Type: No value filed.    Carolyne Phase I: Carolyne Score: 10    Carolyne Phase II:      Anesthesia Post Evaluation    Patient location during evaluation: PACU  Patient participation: complete - patient participated  Level of consciousness: awake  Airway patency: patent  Nausea & Vomiting: no nausea and no vomiting  Cardiovascular status: blood pressure returned to baseline and hemodynamically stable  Respiratory status: acceptable  Hydration status: euvolemic  Comments: Patient in afib when placed on heart monitors for colonscopy.  Asymptomatic.  Rate controlled.  Bp fine.  I told patient to take  mg po qday and see PMD, Clarice Simmons MD whom I will call to inform them.  I warned patient not to ignore this due to risk of stroke.  Multimodal analgesia pain management approach  Pain management: adequate    No notable events documented.

## 2024-08-30 NOTE — TELEPHONE ENCOUNTER
Pt called to schedule appt refused the 9-10-24 opening wants to be seen sooner per Dr. Hernandez advising her she is A-Fib    [phone]    Pls call and advice asap

## 2024-09-03 ENCOUNTER — OFFICE VISIT (OUTPATIENT)
Dept: FAMILY MEDICINE CLINIC | Age: 74
End: 2024-09-03
Payer: MEDICARE

## 2024-09-03 VITALS
HEART RATE: 52 BPM | WEIGHT: 169.2 LBS | RESPIRATION RATE: 12 BRPM | SYSTOLIC BLOOD PRESSURE: 106 MMHG | BODY MASS INDEX: 28.16 KG/M2 | DIASTOLIC BLOOD PRESSURE: 76 MMHG | OXYGEN SATURATION: 96 % | TEMPERATURE: 97.1 F

## 2024-09-03 DIAGNOSIS — I10 ESSENTIAL (PRIMARY) HYPERTENSION: ICD-10-CM

## 2024-09-03 DIAGNOSIS — I48.91 NEW ONSET A-FIB (HCC): ICD-10-CM

## 2024-09-03 DIAGNOSIS — I48.91 ATRIAL FIBRILLATION, UNSPECIFIED TYPE (HCC): Primary | ICD-10-CM

## 2024-09-03 DIAGNOSIS — R94.31 ABNORMAL ELECTROCARDIOGRAM (ECG) (EKG): ICD-10-CM

## 2024-09-03 DIAGNOSIS — I48.91 ATRIAL FIBRILLATION, UNSPECIFIED TYPE (HCC): ICD-10-CM

## 2024-09-03 LAB
ALBUMIN SERPL-MCNC: 4.3 G/DL (ref 3.4–5)
ALBUMIN/GLOB SERPL: 1.9 {RATIO} (ref 1.1–2.2)
ALP SERPL-CCNC: 53 U/L (ref 40–129)
ALT SERPL-CCNC: 17 U/L (ref 10–40)
ANION GAP SERPL CALCULATED.3IONS-SCNC: 10 MMOL/L (ref 3–16)
AST SERPL-CCNC: 21 U/L (ref 15–37)
BILIRUB SERPL-MCNC: 0.4 MG/DL (ref 0–1)
BUN SERPL-MCNC: 21 MG/DL (ref 7–20)
CALCIUM SERPL-MCNC: 9.6 MG/DL (ref 8.3–10.6)
CHLORIDE SERPL-SCNC: 99 MMOL/L (ref 99–110)
CHOLEST SERPL-MCNC: 178 MG/DL (ref 0–199)
CO2 SERPL-SCNC: 30 MMOL/L (ref 21–32)
CREAT SERPL-MCNC: 0.7 MG/DL (ref 0.6–1.2)
DEPRECATED RDW RBC AUTO: 13.2 % (ref 12.4–15.4)
GFR SERPLBLD CREATININE-BSD FMLA CKD-EPI: >90 ML/MIN/{1.73_M2}
GLUCOSE SERPL-MCNC: 90 MG/DL (ref 70–99)
HCT VFR BLD AUTO: 40.6 % (ref 36–48)
HDLC SERPL-MCNC: 51 MG/DL (ref 40–60)
HGB BLD-MCNC: 13.8 G/DL (ref 12–16)
LDL CHOLESTEROL: 102 MG/DL
MCH RBC QN AUTO: 31.3 PG (ref 26–34)
MCHC RBC AUTO-ENTMCNC: 33.9 G/DL (ref 31–36)
MCV RBC AUTO: 92.4 FL (ref 80–100)
PLATELET # BLD AUTO: 290 K/UL (ref 135–450)
PMV BLD AUTO: 8.2 FL (ref 5–10.5)
POTASSIUM SERPL-SCNC: 4.3 MMOL/L (ref 3.5–5.1)
PROT SERPL-MCNC: 6.6 G/DL (ref 6.4–8.2)
RBC # BLD AUTO: 4.39 M/UL (ref 4–5.2)
SODIUM SERPL-SCNC: 139 MMOL/L (ref 136–145)
TRIGL SERPL-MCNC: 126 MG/DL (ref 0–150)
TSH SERPL DL<=0.005 MIU/L-ACNC: 2.31 UIU/ML (ref 0.27–4.2)
VLDLC SERPL CALC-MCNC: 25 MG/DL
WBC # BLD AUTO: 6.2 K/UL (ref 4–11)

## 2024-09-03 PROCEDURE — 3074F SYST BP LT 130 MM HG: CPT | Performed by: NURSE PRACTITIONER

## 2024-09-03 PROCEDURE — 93000 ELECTROCARDIOGRAM COMPLETE: CPT | Performed by: NURSE PRACTITIONER

## 2024-09-03 PROCEDURE — 3078F DIAST BP <80 MM HG: CPT | Performed by: NURSE PRACTITIONER

## 2024-09-03 PROCEDURE — 99215 OFFICE O/P EST HI 40 MIN: CPT | Performed by: NURSE PRACTITIONER

## 2024-09-03 PROCEDURE — 1123F ACP DISCUSS/DSCN MKR DOCD: CPT | Performed by: NURSE PRACTITIONER

## 2024-09-03 ASSESSMENT — ENCOUNTER SYMPTOMS
BACK PAIN: 0
CHEST TIGHTNESS: 0
RHINORRHEA: 0
CONSTIPATION: 0
BLOOD IN STOOL: 0
VOMITING: 0
EYE ITCHING: 0
EYE REDNESS: 0
WHEEZING: 0
DIARRHEA: 0
NAUSEA: 0
SORE THROAT: 0
SINUS PRESSURE: 0
ABDOMINAL PAIN: 0
COUGH: 0
COLOR CHANGE: 0
SHORTNESS OF BREATH: 0

## 2024-09-04 NOTE — PROGRESS NOTES
Tata Narvaez (:  1950) is a 74 y.o. female,Established patient, here for evaluation of the following chief complaint(s):  Atrial Fibrillation (After colonoscopy4 days ago DX with A Fib )      ASSESSMENT/PLAN:  1. Atrial fibrillation, unspecified type (HCC)  -     EKG 12 Lead  -     CBC; Future  -     Lipid, Fasting; Future  -     TSH with Reflex; Future  -     Comprehensive Metabolic Panel; Future  -     Echo (TTE) complete (PRN contrast/bubble/strain/3D); Future  -     apixaban (ELIQUIS) 5 MG TABS tablet; Take 1 tablet by mouth 2 times daily, Disp-180 tablet, R-0Normal  -     Madi Lei MD, Cardiology, [facility]  2. New onset a-fib (HCC)  Assessment & Plan:  This is a new problem that is not controlled and stable at this time.  Repeat EKG was done today and compared to EKG completed during colonoscopy and appears consistent with A-fib.  Upon chart review there are no EKGs to look up further.  Care everywhere did not have any EKGs for review.  Education and discussion about diagnosis discussed with patient at this time.  Will proceed with workup to include CBC, CMP, TSH and lipid panel.  Patient has been started on Eliquis 5 mg twice daily.  She was supplied with a 2-week supply of samples.  Educated patient on the use of this medication and side effects to be aware of.  I have also ordered an echocardiogram to be completed with a referral to cardiology.  I suspect that this has been a an undiagnosed persistent atrial fibrillation that just happened to be discovered on EKG during her colonoscopy.  As patient is asymptomatic and has a normal heart rate no indication for beta-blocker at this time.  All of the above was discussed with patient and she verbalized understanding and agreement with plan.       Orders:  -     CBC; Future  -     Lipid, Fasting; Future  -     TSH with Reflex; Future  -     Comprehensive Metabolic Panel; Future  -     Echo (TTE) complete (PRN

## 2024-09-04 NOTE — ASSESSMENT & PLAN NOTE
This is a new problem that is not controlled and stable at this time.  Repeat EKG was done today and compared to EKG completed during colonoscopy and appears consistent with A-fib.  Upon chart review there are no EKGs to look up further.  Care everywhere did not have any EKGs for review.  Education and discussion about diagnosis discussed with patient at this time.  Will proceed with workup to include CBC, CMP, TSH and lipid panel.  Patient has been started on Eliquis 5 mg twice daily.  She was supplied with a 2-week supply of samples.  Educated patient on the use of this medication and side effects to be aware of.  I have also ordered an echocardiogram to be completed with a referral to cardiology.  I suspect that this has been a an undiagnosed persistent atrial fibrillation that just happened to be discovered on EKG during her colonoscopy.  As patient is asymptomatic and has a normal heart rate no indication for beta-blocker at this time.  All of the above was discussed with patient and she verbalized understanding and agreement with plan.

## 2024-09-13 ENCOUNTER — HOSPITAL ENCOUNTER (OUTPATIENT)
Age: 74
Discharge: HOME | End: 2024-09-15
Payer: MEDICARE

## 2024-09-13 VITALS — HEIGHT: 65 IN | BODY MASS INDEX: 28.16 KG/M2 | WEIGHT: 169 LBS

## 2024-09-13 DIAGNOSIS — R94.31 ABNORMAL ELECTROCARDIOGRAM (ECG) (EKG): ICD-10-CM

## 2024-09-13 DIAGNOSIS — I48.91 NEW ONSET A-FIB (HCC): ICD-10-CM

## 2024-09-13 DIAGNOSIS — I48.91 ATRIAL FIBRILLATION, UNSPECIFIED TYPE (HCC): ICD-10-CM

## 2024-09-13 PROCEDURE — 93306 TTE W/DOPPLER COMPLETE: CPT

## 2024-09-14 LAB
ECHO AO ROOT DIAM: 2.6 CM
ECHO AO ROOT INDEX: 1.41 CM/M2
ECHO AV AREA PEAK VELOCITY: 1.8 CM2
ECHO AV AREA VTI: 1.7 CM2
ECHO AV AREA/BSA PEAK VELOCITY: 1 CM2/M2
ECHO AV AREA/BSA VTI: 0.9 CM2/M2
ECHO AV MEAN GRADIENT: 4 MMHG
ECHO AV MEAN VELOCITY: 0.9 M/S
ECHO AV PEAK GRADIENT: 7 MMHG
ECHO AV PEAK VELOCITY: 1.3 M/S
ECHO AV VELOCITY RATIO: 0.62
ECHO AV VTI: 26.5 CM
ECHO BSA: 1.87 M2
ECHO IVC EXP: 2 CM
ECHO IVC INSP: 0.5 CM
ECHO LA AREA 2C: 28.1 CM2
ECHO LA AREA 4C: 32 CM2
ECHO LA MAJOR AXIS: 7.1 CM
ECHO LA MINOR AXIS: 7 CM
ECHO LA VOL BP: 104 ML (ref 22–52)
ECHO LA VOL MOD A2C: 91 ML (ref 22–52)
ECHO LA VOL MOD A4C: 120 ML (ref 22–52)
ECHO LA VOL/BSA BIPLANE: 57 ML/M2 (ref 16–34)
ECHO LA VOLUME INDEX MOD A2C: 49 ML/M2 (ref 16–34)
ECHO LA VOLUME INDEX MOD A4C: 65 ML/M2 (ref 16–34)
ECHO LV E' LATERAL VELOCITY: 10 CM/S
ECHO LV E' SEPTAL VELOCITY: 8 CM/S
ECHO LV EDV A2C: 79 ML
ECHO LV EDV A4C: 94 ML
ECHO LV EDV INDEX A4C: 51 ML/M2
ECHO LV EDV NDEX A2C: 43 ML/M2
ECHO LV EJECTION FRACTION A2C: 47 %
ECHO LV EJECTION FRACTION A4C: 52 %
ECHO LV EJECTION FRACTION BIPLANE: 51 % (ref 55–100)
ECHO LV ESV A2C: 42 ML
ECHO LV ESV A4C: 45 ML
ECHO LV ESV INDEX A2C: 23 ML/M2
ECHO LV ESV INDEX A4C: 24 ML/M2
ECHO LV FRACTIONAL SHORTENING: 17 % (ref 28–44)
ECHO LV INTERNAL DIMENSION DIASTOLE INDEX: 2.55 CM/M2
ECHO LV INTERNAL DIMENSION DIASTOLIC: 4.7 CM (ref 3.9–5.3)
ECHO LV INTERNAL DIMENSION SYSTOLIC INDEX: 2.12 CM/M2
ECHO LV INTERNAL DIMENSION SYSTOLIC: 3.9 CM
ECHO LV IVSD: 1.1 CM (ref 0.6–0.9)
ECHO LV MASS 2D: 224.8 G (ref 67–162)
ECHO LV MASS INDEX 2D: 122.2 G/M2 (ref 43–95)
ECHO LV POSTERIOR WALL DIASTOLIC: 1.4 CM (ref 0.6–0.9)
ECHO LV RELATIVE WALL THICKNESS RATIO: 0.6
ECHO LVOT AREA: 3.1 CM2
ECHO LVOT AV VTI INDEX: 0.54
ECHO LVOT DIAM: 2 CM
ECHO LVOT MEAN GRADIENT: 1 MMHG
ECHO LVOT PEAK GRADIENT: 2 MMHG
ECHO LVOT PEAK VELOCITY: 0.8 M/S
ECHO LVOT STROKE VOLUME INDEX: 24.2 ML/M2
ECHO LVOT SV: 44.6 ML
ECHO LVOT VTI: 14.2 CM
ECHO MV REGURGITANT PEAK GRADIENT: 21 MMHG
ECHO MV REGURGITANT PEAK VELOCITY: 2.3 M/S
ECHO PV MAX VELOCITY: 0.9 M/S
ECHO PV PEAK GRADIENT: 3 MMHG
ECHO RA AREA 4C: 38.2 CM2
ECHO RA END SYSTOLIC VOLUME APICAL 4 CHAMBER INDEX BSA: 87 ML/M2
ECHO RA VOLUME: 160 ML
ECHO RV BASAL DIMENSION: 4.3 CM
ECHO RV FREE WALL PEAK S': 9 CM/S
ECHO RV LONGITUDINAL DIMENSION: 6.8 CM
ECHO RV MID DIMENSION: 3.1 CM
ECHO RV TAPSE: 1.5 CM (ref 1.7–?)
ECHO TV REGURGITANT MAX VELOCITY: 1.44 M/S
ECHO TV REGURGITANT PEAK GRADIENT: 8 MMHG

## 2024-09-14 PROCEDURE — 93306 TTE W/DOPPLER COMPLETE: CPT | Performed by: INTERNAL MEDICINE

## 2024-10-09 ENCOUNTER — OFFICE VISIT (OUTPATIENT)
Dept: FAMILY MEDICINE CLINIC | Age: 74
End: 2024-10-09

## 2024-10-09 VITALS
DIASTOLIC BLOOD PRESSURE: 80 MMHG | TEMPERATURE: 96.8 F | BODY MASS INDEX: 29.37 KG/M2 | RESPIRATION RATE: 12 BRPM | HEIGHT: 64 IN | SYSTOLIC BLOOD PRESSURE: 120 MMHG | WEIGHT: 172 LBS | HEART RATE: 70 BPM | OXYGEN SATURATION: 98 %

## 2024-10-09 DIAGNOSIS — R73.9 HYPERGLYCEMIA: ICD-10-CM

## 2024-10-09 DIAGNOSIS — I48.91 ATRIAL FIBRILLATION, UNSPECIFIED TYPE (HCC): Primary | ICD-10-CM

## 2024-10-09 DIAGNOSIS — Z00.00 MEDICARE ANNUAL WELLNESS VISIT, SUBSEQUENT: ICD-10-CM

## 2024-10-09 LAB — HBA1C MFR BLD: 5.9 %

## 2024-10-09 ASSESSMENT — PATIENT HEALTH QUESTIONNAIRE - PHQ9
1. LITTLE INTEREST OR PLEASURE IN DOING THINGS: SEVERAL DAYS
7. TROUBLE CONCENTRATING ON THINGS, SUCH AS READING THE NEWSPAPER OR WATCHING TELEVISION: NOT AT ALL
SUM OF ALL RESPONSES TO PHQ QUESTIONS 1-9: 4
10. IF YOU CHECKED OFF ANY PROBLEMS, HOW DIFFICULT HAVE THESE PROBLEMS MADE IT FOR YOU TO DO YOUR WORK, TAKE CARE OF THINGS AT HOME, OR GET ALONG WITH OTHER PEOPLE: NOT DIFFICULT AT ALL
2. FEELING DOWN, DEPRESSED OR HOPELESS: SEVERAL DAYS
5. POOR APPETITE OR OVEREATING: NOT AT ALL
SUM OF ALL RESPONSES TO PHQ QUESTIONS 1-9: 4
4. FEELING TIRED OR HAVING LITTLE ENERGY: SEVERAL DAYS
SUM OF ALL RESPONSES TO PHQ QUESTIONS 1-9: 4
SUM OF ALL RESPONSES TO PHQ QUESTIONS 1-9: 4
3. TROUBLE FALLING OR STAYING ASLEEP: SEVERAL DAYS
SUM OF ALL RESPONSES TO PHQ9 QUESTIONS 1 & 2: 2
6. FEELING BAD ABOUT YOURSELF - OR THAT YOU ARE A FAILURE OR HAVE LET YOURSELF OR YOUR FAMILY DOWN: NOT AT ALL
8. MOVING OR SPEAKING SO SLOWLY THAT OTHER PEOPLE COULD HAVE NOTICED. OR THE OPPOSITE, BEING SO FIGETY OR RESTLESS THAT YOU HAVE BEEN MOVING AROUND A LOT MORE THAN USUAL: NOT AT ALL
9. THOUGHTS THAT YOU WOULD BE BETTER OFF DEAD, OR OF HURTING YOURSELF: NOT AT ALL

## 2024-10-09 ASSESSMENT — LIFESTYLE VARIABLES
HOW MANY STANDARD DRINKS CONTAINING ALCOHOL DO YOU HAVE ON A TYPICAL DAY: PATIENT DOES NOT DRINK
HOW OFTEN DO YOU HAVE A DRINK CONTAINING ALCOHOL: NEVER

## 2024-10-09 NOTE — PATIENT INSTRUCTIONS
years to screen for glaucoma; cataracts, macular degeneration, and other eye disorders.  A preventive dental visit is recommended every 6 months.  Try to get at least 150 minutes of exercise per week or 10,000 steps per day on a pedometer .  Order or download the FREE \"Exercise & Physical Activity: Your Everyday Guide\" from [employer] [place] [place] [employer]. Call 1-980.954.8903 or search [employer] [place] [employer] online.  You need 5774-6380 mg of calcium and 3693-9630 IU of vitamin D per day. It is possible to meet your calcium requirement with diet alone, but a vitamin D supplement is usually necessary to meet this goal.  When exposed to the sun, use a sunscreen that protects against both UVA and UVB radiation with an SPF of 30 or greater. Reapply every 2 to 3 hours or after sweating, drying off with a towel, or swimming.  Always wear a seat belt when traveling in a car. Always wear a helmet when riding a bicycle or motorcycle.

## 2024-10-09 NOTE — ASSESSMENT & PLAN NOTE
9/3/24This is a new problem that is not controlled and stable at this time.  Repeat EKG was done today and compared to EKG completed during colonoscopy and appears consistent with A-fib.  Upon chart review there are no EKGs to look up further.  Care everywhere did not have any EKGs for review.  Education and discussion about diagnosis discussed with patient at this time.  Will proceed with workup to include CBC, CMP, TSH and lipid panel.  Patient has been started on Eliquis 5 mg twice daily.  She was supplied with a 2-week supply of samples.  Educated patient on the use of this medication and side effects to be aware of.  I have also ordered an echocardiogram to be completed with a referral to cardiology.  I suspect that this has been a an undiagnosed persistent atrial fibrillation that just happened to be discovered on EKG during her colonoscopy.  As patient is asymptomatic and has a normal heart rate no indication for beta-blocker at this time.  All of the above was discussed with patient and she verbalized understanding and agreement with plan.  10/9/24: Patient had labs and EKG and echocardiogram done which all was a relatively negative workup.  She has not yet followed up with cardiology but she has been encouraged to do so.  Referral reprinted for her to call to arrange appointment.  She is rate controlled in A-fib today on exam.

## 2024-10-09 NOTE — PROGRESS NOTES
Medicare Annual Wellness Visit    Tata Narvaez is here for Medicare AWV    Assessment & Plan   Atrial fibrillation, unspecified type (HCC)  Assessment & Plan:  9/3/24This is a new problem that is not controlled and stable at this time.  Repeat EKG was done today and compared to EKG completed during colonoscopy and appears consistent with A-fib.  Upon chart review there are no EKGs to look up further.  Care everywhere did not have any EKGs for review.  Education and discussion about diagnosis discussed with patient at this time.  Will proceed with workup to include CBC, CMP, TSH and lipid panel.  Patient has been started on Eliquis 5 mg twice daily.  She was supplied with a 2-week supply of samples.  Educated patient on the use of this medication and side effects to be aware of.  I have also ordered an echocardiogram to be completed with a referral to cardiology.  I suspect that this has been a an undiagnosed persistent atrial fibrillation that just happened to be discovered on EKG during her colonoscopy.  As patient is asymptomatic and has a normal heart rate no indication for beta-blocker at this time.  All of the above was discussed with patient and she verbalized understanding and agreement with plan.  10/9/24: Patient had labs and EKG and echocardiogram done which all was a relatively negative workup.  She has not yet followed up with cardiology but she has been encouraged to do so.  Referral reprinted for her to call to arrange appointment.  She is rate controlled in A-fib today on exam.     Orders:  -     Madi Lei MD, Cardiology, [facility]  Hyperglycemia  -     POCT glycosylated hemoglobin (Hb A1C)  Medicare annual wellness visit, subsequent  Assessment & Plan:  Annual venous visit complete reviewed health maintenance.      Recommendations for Preventive Services Due: see orders and patient instructions/AVS.  Recommended screening schedule for the next 5-10 years is provided to

## 2024-10-10 ENCOUNTER — OFFICE VISIT (OUTPATIENT)
Dept: CARDIOLOGY CLINIC | Age: 74
End: 2024-10-10

## 2024-10-10 ENCOUNTER — NURSE ONLY (OUTPATIENT)
Dept: CARDIOLOGY CLINIC | Age: 74
End: 2024-10-10

## 2024-10-10 VITALS
BODY MASS INDEX: 29.73 KG/M2 | HEART RATE: 90 BPM | SYSTOLIC BLOOD PRESSURE: 108 MMHG | DIASTOLIC BLOOD PRESSURE: 76 MMHG | WEIGHT: 173.2 LBS

## 2024-10-10 DIAGNOSIS — I48.21 PERMANENT ATRIAL FIBRILLATION (HCC): Primary | ICD-10-CM

## 2024-10-10 NOTE — PROGRESS NOTES
studies, cardiac studies including ECG's and telemetry, current and old medical records. The note was completed using EMR and Dragon dictation system. Every effort was made to ensure accuracy; however, inadvertent computerized transcription errors may be present.    All questions and concerns were addressed to the patient/family. Alternatives to my treatment were discussed.     I would like to thank you for providing me the opportunity to participate in the care of your patient. If you have any questions, please do not hesitate to contact me.     Kenneth Pritchard MD, FACC, [facility]  [facility] [place] [address] [address]  Main Office Phone: [phone]

## 2024-10-11 ENCOUNTER — PATIENT MESSAGE (OUTPATIENT)
Dept: FAMILY MEDICINE CLINIC | Age: 74
End: 2024-10-11

## 2024-11-07 ENCOUNTER — OFFICE VISIT (OUTPATIENT)
Dept: CARDIOLOGY CLINIC | Age: 74
End: 2024-11-07

## 2024-11-07 VITALS
WEIGHT: 170.8 LBS | SYSTOLIC BLOOD PRESSURE: 112 MMHG | HEART RATE: 69 BPM | BODY MASS INDEX: 29.32 KG/M2 | DIASTOLIC BLOOD PRESSURE: 82 MMHG

## 2024-11-07 DIAGNOSIS — I48.21 PERMANENT ATRIAL FIBRILLATION (HCC): ICD-10-CM

## 2024-11-07 DIAGNOSIS — R94.31 ABNORMAL ELECTROCARDIOGRAPHY: Primary | ICD-10-CM

## 2024-11-07 NOTE — PATIENT INSTRUCTIONS
PLEASE CALL [facility] SCHEDULING [phone] TO MAKE AN APPOINTMENT FOR YOUR TESTING - amy scan

## 2024-11-07 NOTE — PROGRESS NOTES
Cc: CAF, r/o SSS    HPI:     Patient is 74-year-old woman with history of HTN, MDD, chronic AF.    Patient was diagnosed with A-fib incidentally during colonoscopy on 2024.  ECG on 2024, 9/3/2024 and 10/10/2024 all consistent with AF with controlled ventricular response, no ischemia.  Patient has been completely asymptomatic.  She was started on Eliquis by her PCP on 10/9/2024.    Echo 2024: Normal LV, LVEF 50-55%, moderate RVD with mild dysfunction, mild AI, mild TR, severe LAE.    Monitor 10/2024: % with occasional RVR, 1 episode of NSVT (10 beats), 55 pauses up to 3 seconds.    Patient returns to the clinic today for a follow-up.  She remains completely asymptomatic.      Histories     Past Medical History:   has a past medical history of Chronic neck pain, Hypertension, Major depression, and Multiple actinic keratoses.    Surgical History:   has a past surgical history that includes  section (); Breast biopsy (Left, ); and Colonoscopy (N/A, 2024).     Social History:   reports that she has never smoked. She has never used smokeless tobacco. She reports current alcohol use of about 4.0 standard drinks of alcohol per week. She reports that she does not use drugs.     Family History:  No evidence for sudden cardiac death or premature CAD      Medications:     Home medications were reviewed and are listed below    Prior to Admission medications    Medication Sig Start Date End Date Taking? Authorizing Provider   apixaban (ELIQUIS) 5 MG TABS tablet Take 1 tablet by mouth 2 times daily 9/3/24  Yes Janet Alanis, APRN - CNP   citalopram (CELEXA) 20 MG tablet TAKE 1 AND 1/2 TABLET BY MOUTH DAILY 24  Yes Clarice Simmons MD   lisinopril-hydroCHLOROthiazide (PRINZIDE;ZESTORETIC) 10-12.5 MG per tablet TAKE 1 TABLET BY MOUTH DAILY 24  Yes Clarice Simmons MD   PSYLLIUM PO Take by mouth Powder   Yes ProviderMaximiliano MD   turmeric 500 MG CAPS Take 1 capsule by

## 2024-11-08 PROBLEM — Z00.00 MEDICARE ANNUAL WELLNESS VISIT, SUBSEQUENT: Status: RESOLVED | Noted: 2024-10-09 | Resolved: 2024-11-08
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
## 2024-11-26 ENCOUNTER — HOSPITAL ENCOUNTER (OUTPATIENT)
Dept: NUCLEAR MEDICINE | Age: 74
Discharge: HOME | End: 2024-11-26
Payer: MEDICARE

## 2024-11-26 ENCOUNTER — HOSPITAL ENCOUNTER (OUTPATIENT)
Age: 74
Discharge: HOME | End: 2024-11-28
Payer: MEDICARE

## 2024-11-26 DIAGNOSIS — R94.31 ABNORMAL ELECTROCARDIOGRAPHY: ICD-10-CM

## 2024-11-26 LAB
NUC STRESS EJECTION FRACTION: 69 %
NUC STRESS LV EDV: 101 ML (ref 56–104)
NUC STRESS LV ESV: 31 ML (ref 19–49)
NUC STRESS LV MASS: 146 G
NUC STRESS LV STROKE VOLUME: 70 ML
STRESS BASELINE DIAS BP: 83 MMHG
STRESS BASELINE HR: 53 BPM
STRESS BASELINE SYS BP: 126 MMHG
STRESS ESTIMATED WORKLOAD: 1 METS
STRESS EXERCISE DUR MIN: 1 MIN
STRESS EXERCISE DUR SEC: 0 SEC
STRESS PEAK DIAS BP: 95 MMHG
STRESS PEAK SYS BP: 157 MMHG
STRESS PERCENT HR ACHIEVED: 57 %
STRESS POST PEAK HR: 83 BPM
STRESS RATE PRESSURE PRODUCT: NORMAL BPM*MMHG
STRESS TARGET HR: 145 BPM
TID: 0.95

## 2024-11-26 PROCEDURE — 6360000002 HC RX W HCPCS: Performed by: INTERNAL MEDICINE

## 2024-11-26 PROCEDURE — 93016 CV STRESS TEST SUPVJ ONLY: CPT | Performed by: INTERNAL MEDICINE

## 2024-11-26 PROCEDURE — 78452 HT MUSCLE IMAGE SPECT MULT: CPT

## 2024-11-26 PROCEDURE — 93017 CV STRESS TEST TRACING ONLY: CPT

## 2024-11-26 PROCEDURE — 93018 CV STRESS TEST I&R ONLY: CPT | Performed by: INTERNAL MEDICINE

## 2024-11-26 PROCEDURE — 3430000000 HC RX DIAGNOSTIC RADIOPHARMACEUTICAL: Performed by: INTERNAL MEDICINE

## 2024-11-26 PROCEDURE — 78452 HT MUSCLE IMAGE SPECT MULT: CPT | Performed by: INTERNAL MEDICINE

## 2024-11-26 PROCEDURE — A9502 TC99M TETROFOSMIN: HCPCS | Performed by: INTERNAL MEDICINE

## 2024-11-26 RX ORDER — REGADENOSON 0.08 MG/ML
0.4 INJECTION, SOLUTION INTRAVENOUS
Status: COMPLETED | OUTPATIENT
Start: 2024-11-26 | End: 2024-11-26

## 2024-11-26 RX ADMIN — REGADENOSON 0.4 MG: 0.08 INJECTION, SOLUTION INTRAVENOUS at 10:50

## 2024-11-26 RX ADMIN — TETROFOSMIN 34.2 MILLICURIE: 1.38 INJECTION, POWDER, LYOPHILIZED, FOR SOLUTION INTRAVENOUS at 10:50

## 2024-11-26 RX ADMIN — TETROFOSMIN 10.7 MILLICURIE: 1.38 INJECTION, POWDER, LYOPHILIZED, FOR SOLUTION INTRAVENOUS at 09:35

## 2024-11-27 NOTE — RESULT ENCOUNTER NOTE
Please let patient know that the stress test is within normal limits. Patient will need to continue current therapy. Thank you.

## 2024-11-29 ENCOUNTER — TELEPHONE (OUTPATIENT)
Dept: CARDIOLOGY CLINIC | Age: 74
End: 2024-11-29

## 2024-11-29 NOTE — TELEPHONE ENCOUNTER
----- Message from Dr. Kenneth Pritchard MD sent at 11/27/2024  4:55 PM EST -----  Please let patient know that the stress test is within normal limits. Patient will need to continue current therapy. Thank you.

## 2024-11-29 NOTE — TELEPHONE ENCOUNTER
Called and left patient a voicemail letting her know that the stress test is within normal limits. Patient will need to continue current therapy.

## 2024-12-04 DIAGNOSIS — I48.91 ATRIAL FIBRILLATION, UNSPECIFIED TYPE (HCC): ICD-10-CM

## 2024-12-04 DIAGNOSIS — I48.91 NEW ONSET A-FIB (HCC): ICD-10-CM

## 2024-12-04 RX ORDER — APIXABAN 5 MG/1
5 TABLET, FILM COATED ORAL 2 TIMES DAILY
Qty: 180 TABLET | Refills: 0 | Status: SHIPPED | OUTPATIENT
Start: 2024-12-04

## 2024-12-04 NOTE — TELEPHONE ENCOUNTER
Medication:   Requested Prescriptions     Pending Prescriptions Disp Refills    ELIQUIS 5 MG TABS tablet [Pharmacy Med Name: ELIQUIS 5 MG TABLET] 180 tablet 0     Sig: TAKE 1 TABLET BY MOUTH 2 TIMES A DAY        Last Filled:      Patient Phone Number: [phone] (home)     Last appt: 10/9/2024   Next appt: 2/6/2025    Last OARRS:        No data to display

## 2024-12-10 ENCOUNTER — OFFICE VISIT (OUTPATIENT)
Dept: CARDIOLOGY CLINIC | Age: 74
End: 2024-12-10
Payer: MEDICARE

## 2024-12-10 ENCOUNTER — TELEPHONE (OUTPATIENT)
Dept: CARDIOLOGY CLINIC | Age: 74
End: 2024-12-10

## 2024-12-10 VITALS
WEIGHT: 173.2 LBS | BODY MASS INDEX: 29.73 KG/M2 | DIASTOLIC BLOOD PRESSURE: 74 MMHG | HEART RATE: 59 BPM | SYSTOLIC BLOOD PRESSURE: 110 MMHG

## 2024-12-10 DIAGNOSIS — I10 HYPERTENSION, UNSPECIFIED TYPE: ICD-10-CM

## 2024-12-10 DIAGNOSIS — I48.19 PERSISTENT ATRIAL FIBRILLATION (HCC): Primary | ICD-10-CM

## 2024-12-10 DIAGNOSIS — Z79.01 ON CONTINUOUS ORAL ANTICOAGULATION: ICD-10-CM

## 2024-12-10 PROCEDURE — 93000 ELECTROCARDIOGRAM COMPLETE: CPT | Performed by: INTERNAL MEDICINE

## 2024-12-10 PROCEDURE — 99204 OFFICE O/P NEW MOD 45 MIN: CPT | Performed by: INTERNAL MEDICINE

## 2024-12-10 PROCEDURE — 1123F ACP DISCUSS/DSCN MKR DOCD: CPT | Performed by: INTERNAL MEDICINE

## 2024-12-10 PROCEDURE — 3078F DIAST BP <80 MM HG: CPT | Performed by: INTERNAL MEDICINE

## 2024-12-10 PROCEDURE — 3074F SYST BP LT 130 MM HG: CPT | Performed by: INTERNAL MEDICINE

## 2024-12-10 PROCEDURE — 1159F MED LIST DOCD IN RCRD: CPT | Performed by: INTERNAL MEDICINE

## 2024-12-10 RX ORDER — FLECAINIDE ACETATE 100 MG/1
100 TABLET ORAL 2 TIMES DAILY
Qty: 60 TABLET | Refills: 5 | Status: SHIPPED | OUTPATIENT
Start: 2024-12-10

## 2024-12-10 NOTE — PATIENT INSTRUCTIONS
Life stye modifications for atrial fibrillation:    Keep your blood pressure under control.   Keep your blood sugar under control.   Eat heart healthy diet  Minimize alcohol intake  Stop smoking  Be active, keep your body weight optimal  Exercise on a regular basis  Manage your stress   If you snore, get evaluated for sleep apnea  Be aware of the symptoms of stroke     External Cardioversion    Date of Procedure:    Dec. 20, 2025    Time of Arrival:   9:00 to the office for ECG    Cardiologist performing procedure: Dr. Mathews    Arrive at [facility] through the main entrance.  Check in at the Outpatient Diagnostic desk on the 1st floor.    Do not eat or drink anything after midnight the night before the procedure.      You may brush your teeth and rinse the morning of the procedure.    Do NOT stop taking Eliquis (blood thinners).  It is very important to not miss any doses of Eliquis leading up to the cardioversion.  Please call our office if you accidentally miss a dose of your blood thinner.    Take all your other routine medications the morning of the procedure with the following exceptions:  If you are taking diabetic medications, please HOLD on the day of the procedure (including insulin).  If you take Lantus insulin, take HALF of your usual dose the night before.  If you take a water pill, please HOLD on the day of the procedure.    Do not put on any lotion, powder, or deodorant the morning of the procedure.    Please bring a list of your medications to the hospital with you.    You must have someone available to drive you home. It is recommended that you do not drive for 24 hours after the procedure. You will also need someone with you at home the night of the procedure.    If you are unable to make this appointment, please call [facility], at [phone].

## 2024-12-10 NOTE — TELEPHONE ENCOUNTER
Please call and schedule pt for DCCV with [facility] at [facility] on 12/20/24 at 1100. Please review instructions below with pt.

## 2024-12-16 ENCOUNTER — TELEPHONE (OUTPATIENT)
Dept: CARDIOLOGY CLINIC | Age: 74
End: 2024-12-16

## 2024-12-16 NOTE — TELEPHONE ENCOUNTER
LVM asking pt to call back.  Need to confirm date/time and preop instructions for External Cardioversion          [facility]        Instructions    Life stye modifications for atrial fibrillation:     Keep your blood pressure under control.   Keep your blood sugar under control.   Eat heart healthy diet  Minimize alcohol intake  Stop smoking  Be active, keep your body weight optimal  Exercise on a regular basis  Manage your stress   If you snore, get evaluated for sleep apnea  Be aware of the symptoms of stroke     External Cardioversion     Date of Procedure:    Dec. 20, 2025     Time of Arrival:   9:00 to the office for ECG     Cardiologist performing procedure: Dr. Mathews     Arrive at [facility] through the main entrance.  Check in at the Outpatient Diagnostic desk on the 1st floor.     Do not eat or drink anything after midnight the night before the procedure.       You may brush your teeth and rinse the morning of the procedure.     Do NOT stop taking Eliquis (blood thinners).  It is very important to not miss any doses of Eliquis leading up to the cardioversion.  Please call our office if you accidentally miss a dose of your blood thinner.     Take all your other routine medications the morning of the procedure with the following exceptions:  If you are taking diabetic medications, please HOLD on the day of the procedure (including insulin).  If you take Lantus insulin, take HALF of your usual dose the night before.  If you take a water pill, please HOLD on the day of the procedure.     Do not put on any lotion, powder, or deodorant the morning of the procedure.     Please bring a list of your medications to the hospital with you.     You must have someone available to drive you home. It is recommended that you do not drive for 24 hours after the procedure. You will also need someone with you at home the night of the procedure.     If you are unable to make this appointment, please call [facility]

## 2024-12-17 ENCOUNTER — PREP FOR PROCEDURE (OUTPATIENT)
Dept: CARDIOLOGY CLINIC | Age: 74
End: 2024-12-17

## 2024-12-17 RX ORDER — SODIUM CHLORIDE 0.9 % (FLUSH) 0.9 %
5-40 SYRINGE (ML) INJECTION PRN
Status: CANCELLED | OUTPATIENT
Start: 2024-12-17

## 2024-12-17 RX ORDER — MIDAZOLAM HYDROCHLORIDE 1 MG/ML
1 INJECTION, SOLUTION INTRAMUSCULAR; INTRAVENOUS ONCE
Status: CANCELLED | OUTPATIENT
Start: 2024-12-17 | End: 2024-12-17

## 2024-12-17 RX ORDER — SODIUM CHLORIDE 0.9 % (FLUSH) 0.9 %
5-40 SYRINGE (ML) INJECTION EVERY 12 HOURS SCHEDULED
Status: CANCELLED | OUTPATIENT
Start: 2024-12-17

## 2024-12-17 RX ORDER — SODIUM CHLORIDE 9 MG/ML
INJECTION, SOLUTION INTRAVENOUS PRN
Status: CANCELLED | OUTPATIENT
Start: 2024-12-17

## 2024-12-20 ENCOUNTER — HOSPITAL ENCOUNTER (OUTPATIENT)
Dept: INTERVENTIONAL RADIOLOGY/VASCULAR | Age: 74
Discharge: HOME | End: 2024-12-22
Attending: INTERNAL MEDICINE
Payer: MEDICARE

## 2024-12-20 ENCOUNTER — ANESTHESIA EVENT (OUTPATIENT)
Dept: INTERVENTIONAL RADIOLOGY/VASCULAR | Age: 74
End: 2024-12-20
Payer: MEDICARE

## 2024-12-20 ENCOUNTER — NURSE ONLY (OUTPATIENT)
Dept: CARDIOLOGY CLINIC | Age: 74
End: 2024-12-20
Payer: MEDICARE

## 2024-12-20 ENCOUNTER — ANESTHESIA (OUTPATIENT)
Dept: INTERVENTIONAL RADIOLOGY/VASCULAR | Age: 74
End: 2024-12-20
Payer: MEDICARE

## 2024-12-20 VITALS
OXYGEN SATURATION: 94 % | SYSTOLIC BLOOD PRESSURE: 118 MMHG | RESPIRATION RATE: 13 BRPM | DIASTOLIC BLOOD PRESSURE: 74 MMHG | HEART RATE: 68 BPM

## 2024-12-20 DIAGNOSIS — I48.21 PERMANENT ATRIAL FIBRILLATION (HCC): Primary | ICD-10-CM

## 2024-12-20 DIAGNOSIS — I48.19 PERSISTENT ATRIAL FIBRILLATION (HCC): ICD-10-CM

## 2024-12-20 LAB
ANION GAP SERPL CALCULATED.3IONS-SCNC: 10 MMOL/L (ref 3–16)
BUN SERPL-MCNC: 19 MG/DL (ref 7–20)
CALCIUM SERPL-MCNC: 9.3 MG/DL (ref 8.3–10.6)
CHLORIDE SERPL-SCNC: 101 MMOL/L (ref 99–110)
CO2 SERPL-SCNC: 28 MMOL/L (ref 21–32)
CREAT SERPL-MCNC: 0.8 MG/DL (ref 0.6–1.2)
EKG DIAGNOSIS: NORMAL
EKG Q-T INTERVAL: 454 MS
EKG QRS DURATION: 146 MS
EKG QTC CALCULATION (BAZETT): 479 MS
EKG R AXIS: -58 DEGREES
EKG T AXIS: 23 DEGREES
EKG VENTRICULAR RATE: 67 BPM
GFR SERPLBLD CREATININE-BSD FMLA CKD-EPI: 77 ML/MIN/{1.73_M2}
GLUCOSE SERPL-MCNC: 108 MG/DL (ref 70–99)
INR PPP: 1.07 (ref 0.85–1.15)
POTASSIUM SERPL-SCNC: 4.1 MMOL/L (ref 3.5–5.1)
PROTHROMBIN TIME: 14.1 SEC (ref 11.9–14.9)
SODIUM SERPL-SCNC: 139 MMOL/L (ref 136–145)

## 2024-12-20 PROCEDURE — 6360000002 HC RX W HCPCS

## 2024-12-20 PROCEDURE — 93000 ELECTROCARDIOGRAM COMPLETE: CPT | Performed by: INTERNAL MEDICINE

## 2024-12-20 PROCEDURE — 3700000000 HC ANESTHESIA ATTENDED CARE: Performed by: INTERNAL MEDICINE

## 2024-12-20 PROCEDURE — 7100000011 HC PHASE II RECOVERY - ADDTL 15 MIN: Performed by: INTERNAL MEDICINE

## 2024-12-20 PROCEDURE — 92960 CARDIOVERSION ELECTRIC EXT: CPT | Performed by: INTERNAL MEDICINE

## 2024-12-20 PROCEDURE — 93005 ELECTROCARDIOGRAM TRACING: CPT | Performed by: INTERNAL MEDICINE

## 2024-12-20 PROCEDURE — 93010 ELECTROCARDIOGRAM REPORT: CPT | Performed by: INTERNAL MEDICINE

## 2024-12-20 PROCEDURE — 80048 BASIC METABOLIC PNL TOTAL CA: CPT

## 2024-12-20 PROCEDURE — 92960 CARDIOVERSION ELECTRIC EXT: CPT

## 2024-12-20 PROCEDURE — 7100000010 HC PHASE II RECOVERY - FIRST 15 MIN: Performed by: INTERNAL MEDICINE

## 2024-12-20 PROCEDURE — 85610 PROTHROMBIN TIME: CPT

## 2024-12-20 RX ORDER — NALOXONE HYDROCHLORIDE 0.4 MG/ML
INJECTION, SOLUTION INTRAMUSCULAR; INTRAVENOUS; SUBCUTANEOUS PRN
Status: CANCELLED | OUTPATIENT
Start: 2024-12-20

## 2024-12-20 RX ORDER — SODIUM CHLORIDE 0.9 % (FLUSH) 0.9 %
5-40 SYRINGE (ML) INJECTION PRN
Status: DISCONTINUED | OUTPATIENT
Start: 2024-12-20 | End: 2024-12-23 | Stop reason: HOSPADM

## 2024-12-20 RX ORDER — SODIUM CHLORIDE 9 MG/ML
INJECTION, SOLUTION INTRAVENOUS PRN
Status: DISCONTINUED | OUTPATIENT
Start: 2024-12-20 | End: 2024-12-23 | Stop reason: HOSPADM

## 2024-12-20 RX ORDER — ONDANSETRON 2 MG/ML
4 INJECTION INTRAMUSCULAR; INTRAVENOUS
Status: CANCELLED | OUTPATIENT
Start: 2024-12-20 | End: 2024-12-21

## 2024-12-20 RX ORDER — SODIUM CHLORIDE 9 MG/ML
INJECTION, SOLUTION INTRAVENOUS PRN
Status: CANCELLED | OUTPATIENT
Start: 2024-12-20

## 2024-12-20 RX ORDER — HYDROMORPHONE HYDROCHLORIDE 1 MG/ML
0.5 INJECTION, SOLUTION INTRAMUSCULAR; INTRAVENOUS; SUBCUTANEOUS EVERY 5 MIN PRN
Status: CANCELLED | OUTPATIENT
Start: 2024-12-20

## 2024-12-20 RX ORDER — METOCLOPRAMIDE HYDROCHLORIDE 5 MG/ML
10 INJECTION INTRAMUSCULAR; INTRAVENOUS
Status: CANCELLED | OUTPATIENT
Start: 2024-12-20 | End: 2024-12-21

## 2024-12-20 RX ORDER — OXYCODONE HYDROCHLORIDE 5 MG/1
10 TABLET ORAL PRN
Status: CANCELLED | OUTPATIENT
Start: 2024-12-20 | End: 2024-12-20

## 2024-12-20 RX ORDER — SODIUM CHLORIDE 0.9 % (FLUSH) 0.9 %
5-40 SYRINGE (ML) INJECTION PRN
Status: CANCELLED | OUTPATIENT
Start: 2024-12-20

## 2024-12-20 RX ORDER — OXYCODONE HYDROCHLORIDE 5 MG/1
5 TABLET ORAL PRN
Status: CANCELLED | OUTPATIENT
Start: 2024-12-20 | End: 2024-12-20

## 2024-12-20 RX ORDER — MIDAZOLAM HYDROCHLORIDE 1 MG/ML
1 INJECTION, SOLUTION INTRAMUSCULAR; INTRAVENOUS ONCE
Status: DISCONTINUED | OUTPATIENT
Start: 2024-12-20 | End: 2024-12-23 | Stop reason: HOSPADM

## 2024-12-20 RX ORDER — SODIUM CHLORIDE 0.9 % (FLUSH) 0.9 %
5-40 SYRINGE (ML) INJECTION EVERY 12 HOURS SCHEDULED
Status: DISCONTINUED | OUTPATIENT
Start: 2024-12-20 | End: 2024-12-23 | Stop reason: HOSPADM

## 2024-12-20 RX ORDER — FENTANYL CITRATE 50 UG/ML
25 INJECTION, SOLUTION INTRAMUSCULAR; INTRAVENOUS EVERY 5 MIN PRN
Status: CANCELLED | OUTPATIENT
Start: 2024-12-20

## 2024-12-20 RX ORDER — PROPOFOL 10 MG/ML
INJECTION, EMULSION INTRAVENOUS
Status: DISCONTINUED | OUTPATIENT
Start: 2024-12-20 | End: 2024-12-20 | Stop reason: SDUPTHER

## 2024-12-20 RX ORDER — SODIUM CHLORIDE 0.9 % (FLUSH) 0.9 %
5-40 SYRINGE (ML) INJECTION EVERY 12 HOURS SCHEDULED
Status: CANCELLED | OUTPATIENT
Start: 2024-12-20

## 2024-12-20 RX ADMIN — PROPOFOL 50 MG: 10 INJECTION, EMULSION INTRAVENOUS at 11:07

## 2024-12-20 NOTE — ANESTHESIA POSTPROCEDURE EVALUATION
Department of Anesthesiology  Postprocedure Note    Patient: Tata Narvaez  MRN: [id number]  YOB: 1950  Date of evaluation: 12/20/2024    Procedure Summary       Date: 12/20/24 Room / Location: [facility] Special Procedures    Anesthesia Start: 1059 Anesthesia Stop: 1113    Procedure: CARDIOVERSION EXTERNAL Diagnosis: Persistent atrial fibrillation (HCC)    Scheduled Providers: Armond Kuo DO Responsible Provider: Armond Kuo DO    Anesthesia Type: MAC ASA Status: 3            Anesthesia Type: No value filed.    Carolyne Phase I: Carolyne Score: 10    Carolyne Phase II:      Anesthesia Post Evaluation    Patient location during evaluation: PACU  Patient participation: complete - patient participated  Level of consciousness: awake and alert  Airway patency: patent  Nausea & Vomiting: no nausea  Cardiovascular status: hemodynamically stable  Respiratory status: acceptable  Hydration status: stable  Pain management: adequate    No notable events documented.

## 2024-12-20 NOTE — PROGRESS NOTES
Completed an EKG on pt. Appeared to still be out of sinus rhythm. Pt headed over to [facility] to have procedure completed.

## 2024-12-20 NOTE — DISCHARGE INSTRUCTIONS
[facility]  Cardiovascular Special Procedures  Cardioversion  Patient Discharge Instructions      No driving for 24 hours.  We strongly recommend that a responsible adult stay with you for the next 24 hours.    Continue Eliquis as directed by physician.    Hydrocortisone 1% cream to reddened areas as needed.         [facility]  Cardiovascular Special Procedures  General Discharge Instructions    PROCEDURE: Synchronized Cardioversion    __X__ You may be drowsy or lightheaded after receiving sedation. DO NOT operate a vehicle (automobile, bicycle, motorcycle, machinery, or power tools), make any important decisions or sign any important/legal documents, or drink alcoholic beverages for the next 24 hours  _X___ We strongly suggest that a responsible adult be with you for the next 24 hours for your protection and safety  _X___ If the intravenous catheter site is painful, apply warm wet compresses on the site until the soreness is relieved and elevate the arm above the heart.  Call your physician if no improvement in 2 to 3 days    DIETARY INSTRUCTIONS:    ____ Drink extra fluids over the next 24 hours (If not contraindicated by illness or by physician order)  ____ Start with clear liquids and progress to normal diet as you feel like eating.  If you experience nausea or repeated episodes of vomiting, which persist beyond 12-24 hours, notify your doctor        __X__ Resume your previous diet    ACTIVITY INSTRUCTIONS:    ____ See other instructions  ____ No special instructions  ____ Rest for 24 hours    ____ Up as tolerated  ____ Increase activity as tolerated    Wound/Dressing Instructions:  ____ See other instructions  ____ May shower, tomorrow  ____ Remove bandage within 24 hours    MEDICATION INSTRUCTIONS:    ____ See Medication Reconciliation Sheet      SPECIAL

## 2024-12-20 NOTE — ANESTHESIA PRE PROCEDURE
Department of Anesthesiology  Preprocedure Note       Name:  Tata Narvaez   Age:  74 y.o.  :  1950                                          MRN:  [id number]         Date:  2024      Surgeon: * No surgeons listed *    Procedure: * No procedures listed *    Medications prior to admission:   Prior to Admission medications    Medication Sig Start Date End Date Taking? Authorizing Provider   flecainide (TAMBOCOR) 100 MG tablet Take 1 tablet by mouth 2 times daily 12/10/24  Yes Kevyn Manuel MD   ELIQUIS 5 MG TABS tablet TAKE 1 TABLET BY MOUTH 2 TIMES A DAY 24  Yes Janet Alanis, APRN - CNP   citalopram (CELEXA) 20 MG tablet TAKE 1 AND 1/2 TABLET BY MOUTH DAILY 24  Yes Clarice Simmons MD   lisinopril-hydroCHLOROthiazide (PRINZIDE;ZESTORETIC) 10-12.5 MG per tablet TAKE 1 TABLET BY MOUTH DAILY 24  Yes Clarice Simmons MD   PSYLLIUM PO Take by mouth Powder    Maximiliano Lai MD   turmeric 500 MG CAPS Take 1 capsule by mouth daily    Maximiliano Lai MD   ASHWAGANDHA PO Take by mouth    ProviderMaximiliano MD   Saccharomyces boulardii (FLORASTOR PO) Take 1 caplet by mouth daily    Maximiliano Lai MD   FLUTICASONE PROPIONATE NA by Nasal route    Maximiliano Lai MD   BIOTIN PO Take by mouth    Maximiliano Lai MD   vitamin E 400 UNIT capsule Take 1 capsule by mouth daily    Maximiliano Lai MD   Cholecalciferol (VITAMIN D3) 2000 units CAPS Take by mouth    ProviderMaximiliano MD   Coenzyme Q10 (CO Q 10) 60 MG CAPS Take by mouth    ProviderMaximiliano MD       Current medications:    Current Outpatient Medications   Medication Sig Dispense Refill   • flecainide (TAMBOCOR) 100 MG tablet Take 1 tablet by mouth 2 times daily 60 tablet 5   • ELIQUIS 5 MG TABS tablet TAKE 1 TABLET BY MOUTH 2 TIMES A  tablet 0   • citalopram (CELEXA) 20 MG tablet TAKE 1 AND 1/2 TABLET BY MOUTH DAILY 135 tablet 1   • lisinopril-hydroCHLOROthiazide

## 2025-02-04 RX ORDER — CITALOPRAM HYDROBROMIDE 20 MG/1
TABLET ORAL
Qty: 135 TABLET | Refills: 1 | Status: SHIPPED | OUTPATIENT
Start: 2025-02-04

## 2025-02-04 RX ORDER — LISINOPRIL AND HYDROCHLOROTHIAZIDE 10; 12.5 MG/1; MG/1
TABLET ORAL
Qty: 90 TABLET | Refills: 1 | Status: SHIPPED | OUTPATIENT
Start: 2025-02-04

## 2025-02-04 NOTE — PROGRESS NOTES
[facility]   Cardiac Electrophysiology Consultation   Date: 2/11/2025  Reason for Consultation:   Consult Requesting Physician: No att. providers found     Chief Complaint:   Chief Complaint   Patient presents with    Atrial Fibrillation      HPI: Tata Narvaez is a 74 y.o. female with PMH significant for depression, HTN, AF CVR found during colonoscopy (8/2024), asymptomatic, started on Eliquis. Echo (9/2024) showed EF 50-55% and severe LAE. Monitor (10/2024) showed 100% AF CVR, NSVT, and 55 pauses up to 3 sec, no reported symptoms. MPI (11/2024) showed no evidence of inducible ischemia. Started flecainide, s/p DCCV to SR (12/20/24), flecainide stopped d/t prolonged MA interval.    Interval History:   Today, she is here for f/u post DCCV, presenting in SR first degree AVB. She does not feel any differently being in SR. Denies complaints of palpitations, dizziness, CP, SOB, orthopnea, BLE swelling, or syncope. Her first BP in office today was 124/100 with a recheck of 130/80. She has a BP cuff at home, but she doesn't use it as she feels that it may not accurate.    Assessment:  Persistent AF, asymptomatic, on Eliquis  HTN, stable on lisinopril/HCTZ  CMP, mild, EF 50-55%, likely tachycardia induced  LAE, AF likely going on for some time    Plan:  No changes today. She cannot appreciate a difference in her symptoms being in SR  Discussed monitoring for asymptomatic AF with smart watch, daily [employer] mobile check, versus ILR. She is interested in the [employer] mobile as she doesn't like the idea of watching constantly wearing a watch.  Discussed lifestyle modifications including BP control  Follow up with EP NP in 6 months    Atrial Fibrillation:    BMI    :   Body mass index is 29.7 kg/m².    Duration   :   8/2024    Symptoms   :   Easy fatigability, decline in his functional capacity    Previous DCCV :   12/20/24    Previous AAD             :   flecainide 12/2024    Beta blocker  :   none    ACE /

## 2025-02-06 ENCOUNTER — OFFICE VISIT (OUTPATIENT)
Dept: FAMILY MEDICINE CLINIC | Age: 75
End: 2025-02-06
Payer: MEDICARE

## 2025-02-06 VITALS
TEMPERATURE: 96.9 F | SYSTOLIC BLOOD PRESSURE: 118 MMHG | OXYGEN SATURATION: 95 % | WEIGHT: 172 LBS | DIASTOLIC BLOOD PRESSURE: 70 MMHG | BODY MASS INDEX: 29.52 KG/M2 | HEART RATE: 63 BPM | RESPIRATION RATE: 16 BRPM

## 2025-02-06 DIAGNOSIS — I48.21 PERMANENT ATRIAL FIBRILLATION (HCC): Primary | ICD-10-CM

## 2025-02-06 PROCEDURE — 3074F SYST BP LT 130 MM HG: CPT | Performed by: NURSE PRACTITIONER

## 2025-02-06 PROCEDURE — 3078F DIAST BP <80 MM HG: CPT | Performed by: NURSE PRACTITIONER

## 2025-02-06 PROCEDURE — G2211 COMPLEX E/M VISIT ADD ON: HCPCS | Performed by: NURSE PRACTITIONER

## 2025-02-06 PROCEDURE — 99214 OFFICE O/P EST MOD 30 MIN: CPT | Performed by: NURSE PRACTITIONER

## 2025-02-06 PROCEDURE — 1159F MED LIST DOCD IN RCRD: CPT | Performed by: NURSE PRACTITIONER

## 2025-02-06 PROCEDURE — 1123F ACP DISCUSS/DSCN MKR DOCD: CPT | Performed by: NURSE PRACTITIONER

## 2025-02-06 SDOH — ECONOMIC STABILITY: FOOD INSECURITY: WITHIN THE PAST 12 MONTHS, THE FOOD YOU BOUGHT JUST DIDN'T LAST AND YOU DIDN'T HAVE MONEY TO GET MORE.: NEVER TRUE

## 2025-02-06 SDOH — ECONOMIC STABILITY: FOOD INSECURITY: WITHIN THE PAST 12 MONTHS, YOU WORRIED THAT YOUR FOOD WOULD RUN OUT BEFORE YOU GOT MONEY TO BUY MORE.: NEVER TRUE

## 2025-02-06 ASSESSMENT — PATIENT HEALTH QUESTIONNAIRE - PHQ9
1. LITTLE INTEREST OR PLEASURE IN DOING THINGS: NOT AT ALL
3. TROUBLE FALLING OR STAYING ASLEEP: NOT AT ALL
SUM OF ALL RESPONSES TO PHQ QUESTIONS 1-9: 0
SUM OF ALL RESPONSES TO PHQ QUESTIONS 1-9: 0
9. THOUGHTS THAT YOU WOULD BE BETTER OFF DEAD, OR OF HURTING YOURSELF: NOT AT ALL
SUM OF ALL RESPONSES TO PHQ QUESTIONS 1-9: 0
8. MOVING OR SPEAKING SO SLOWLY THAT OTHER PEOPLE COULD HAVE NOTICED. OR THE OPPOSITE, BEING SO FIGETY OR RESTLESS THAT YOU HAVE BEEN MOVING AROUND A LOT MORE THAN USUAL: NOT AT ALL
4. FEELING TIRED OR HAVING LITTLE ENERGY: NOT AT ALL
5. POOR APPETITE OR OVEREATING: NOT AT ALL
2. FEELING DOWN, DEPRESSED OR HOPELESS: NOT AT ALL
6. FEELING BAD ABOUT YOURSELF - OR THAT YOU ARE A FAILURE OR HAVE LET YOURSELF OR YOUR FAMILY DOWN: NOT AT ALL
SUM OF ALL RESPONSES TO PHQ QUESTIONS 1-9: 0
SUM OF ALL RESPONSES TO PHQ9 QUESTIONS 1 & 2: 0
7. TROUBLE CONCENTRATING ON THINGS, SUCH AS READING THE NEWSPAPER OR WATCHING TELEVISION: NOT AT ALL
10. IF YOU CHECKED OFF ANY PROBLEMS, HOW DIFFICULT HAVE THESE PROBLEMS MADE IT FOR YOU TO DO YOUR WORK, TAKE CARE OF THINGS AT HOME, OR GET ALONG WITH OTHER PEOPLE: NOT DIFFICULT AT ALL

## 2025-02-06 ASSESSMENT — ENCOUNTER SYMPTOMS
SHORTNESS OF BREATH: 0
CHEST TIGHTNESS: 0
COUGH: 0

## 2025-02-06 NOTE — PROGRESS NOTES
Tata Narvaez (:  1950) is a 74 y.o. female,Established patient, here for evaluation of the following chief complaint(s):  Medication Check      ASSESSMENT/PLAN:  1. Permanent atrial fibrillation (HCC)  Assessment & Plan:  Stable.   Well controlled  She has an appointment with cardiology scheduled.   Continue current plan.      No follow-ups on file.    SUBJECTIVE/OBJECTIVE:  Tata presents today for follow-up on new diagnosis of Afib and cardioversion. She denies chest pain, SOB, palpations or dizziness. She takes her medications as prescribed and denies side effects. She does not have any other concerns.       Current Outpatient Medications   Medication Sig Dispense Refill    citalopram (CELEXA) 20 MG tablet TAKE 1 AND 1/2 TABLET BY MOUTH DAILY 135 tablet 1    lisinopril-hydroCHLOROthiazide (PRINZIDE;ZESTORETIC) 10-12.5 MG per tablet TAKE 1 TABLET BY MOUTH DAILY 90 tablet 1    flecainide (TAMBOCOR) 100 MG tablet Take 1 tablet by mouth 2 times daily 60 tablet 5    ELIQUIS 5 MG TABS tablet TAKE 1 TABLET BY MOUTH 2 TIMES A  tablet 0    PSYLLIUM PO Take by mouth Powder      turmeric 500 MG CAPS Take 1 capsule by mouth daily      ASHWAGANDHA PO Take by mouth      Saccharomyces boulardii (FLORASTOR PO) Take 1 caplet by mouth daily      FLUTICASONE PROPIONATE NA by Nasal route      BIOTIN PO Take by mouth      vitamin E 400 UNIT capsule Take 1 capsule by mouth daily      Cholecalciferol (VITAMIN D3) 2000 units CAPS Take by mouth      Coenzyme Q10 (CO Q 10) 60 MG CAPS Take by mouth       No current facility-administered medications for this visit.         Review of Systems   Constitutional:  Negative for fatigue.   Respiratory:  Negative for cough, chest tightness and shortness of breath.    Cardiovascular:  Negative for chest pain, palpitations and leg swelling.   Neurological:  Negative for dizziness, weakness, light-headedness and headaches.   Psychiatric/Behavioral: Negative.         Vitals:

## 2025-02-06 NOTE — ASSESSMENT & PLAN NOTE
Stable.   Well controlled  She has an appointment with cardiology scheduled.   Continue current plan.

## 2025-02-11 ENCOUNTER — OFFICE VISIT (OUTPATIENT)
Dept: CARDIOLOGY CLINIC | Age: 75
End: 2025-02-11
Payer: MEDICARE

## 2025-02-11 VITALS
DIASTOLIC BLOOD PRESSURE: 80 MMHG | SYSTOLIC BLOOD PRESSURE: 130 MMHG | HEART RATE: 71 BPM | WEIGHT: 173 LBS | BODY MASS INDEX: 29.7 KG/M2

## 2025-02-11 DIAGNOSIS — Z79.899 ENCOUNTER FOR MONITORING FLECAINIDE THERAPY: ICD-10-CM

## 2025-02-11 DIAGNOSIS — I48.19 PERSISTENT ATRIAL FIBRILLATION (HCC): Primary | ICD-10-CM

## 2025-02-11 DIAGNOSIS — Z51.81 ENCOUNTER FOR MONITORING FLECAINIDE THERAPY: ICD-10-CM

## 2025-02-11 DIAGNOSIS — I10 HYPERTENSION, UNSPECIFIED TYPE: ICD-10-CM

## 2025-02-11 DIAGNOSIS — Z79.01 ON CONTINUOUS ORAL ANTICOAGULATION: ICD-10-CM

## 2025-02-11 PROCEDURE — 93000 ELECTROCARDIOGRAM COMPLETE: CPT | Performed by: INTERNAL MEDICINE

## 2025-02-11 PROCEDURE — 3074F SYST BP LT 130 MM HG: CPT | Performed by: INTERNAL MEDICINE

## 2025-02-11 PROCEDURE — 1159F MED LIST DOCD IN RCRD: CPT | Performed by: INTERNAL MEDICINE

## 2025-02-11 PROCEDURE — 1123F ACP DISCUSS/DSCN MKR DOCD: CPT | Performed by: INTERNAL MEDICINE

## 2025-02-11 PROCEDURE — 3079F DIAST BP 80-89 MM HG: CPT | Performed by: INTERNAL MEDICINE

## 2025-02-11 PROCEDURE — 99214 OFFICE O/P EST MOD 30 MIN: CPT | Performed by: INTERNAL MEDICINE

## 2025-03-06 DIAGNOSIS — I48.91 NEW ONSET A-FIB (HCC): ICD-10-CM

## 2025-03-06 DIAGNOSIS — I48.91 ATRIAL FIBRILLATION, UNSPECIFIED TYPE (HCC): ICD-10-CM

## 2025-03-06 RX ORDER — APIXABAN 5 MG/1
5 TABLET, FILM COATED ORAL 2 TIMES DAILY
Qty: 180 TABLET | Refills: 0 | Status: SHIPPED | OUTPATIENT
Start: 2025-03-06

## 2025-03-06 NOTE — TELEPHONE ENCOUNTER
Medication:   Requested Prescriptions     Pending Prescriptions Disp Refills    ELIQUIS 5 MG TABS tablet [Pharmacy Med Name: ELIQUIS 5 MG TABLET] 180 tablet 0     Sig: TAKE 1 TABLET BY MOUTH 2 TIMES A DAY        Last Filled:      Patient Phone Number: [phone] (home)     Last appt: 2/6/2025   Next appt: 6/5/2025    Last OARRS:        No data to display

## 2025-03-12 ENCOUNTER — TELEPHONE (OUTPATIENT)
Dept: FAMILY MEDICINE CLINIC | Age: 75
End: 2025-03-12

## 2025-03-12 NOTE — TELEPHONE ENCOUNTER
Pt is taking a new rx of Eliquis 5mg  BID. Pt would like to switch to another blood thinner that she can take 1 QD. Cost will be cheaper than Eliquis also.

## 2025-03-12 NOTE — TELEPHONE ENCOUNTER
I sent 20 mg Xarelto to the pharmacy unsure if this will be a cheaper option but it is a daily pill instead of twice a day Eliquis.

## 2025-04-02 ENCOUNTER — HOSPITAL ENCOUNTER (OUTPATIENT)
Dept: MAMMOGRAPHY | Age: 75
Discharge: HOME | End: 2025-04-02
Payer: MEDICARE

## 2025-04-02 ENCOUNTER — RESULTS FOLLOW-UP (OUTPATIENT)
Dept: FAMILY MEDICINE CLINIC | Age: 75
End: 2025-04-02

## 2025-04-02 VITALS — BODY MASS INDEX: 28.99 KG/M2 | WEIGHT: 174 LBS | HEIGHT: 65 IN

## 2025-04-02 DIAGNOSIS — Z12.31 VISIT FOR SCREENING MAMMOGRAM: ICD-10-CM

## 2025-04-02 PROCEDURE — 77063 BREAST TOMOSYNTHESIS BI: CPT

## 2025-06-05 ENCOUNTER — OFFICE VISIT (OUTPATIENT)
Dept: FAMILY MEDICINE CLINIC | Age: 75
End: 2025-06-05

## 2025-06-05 VITALS
HEIGHT: 66 IN | SYSTOLIC BLOOD PRESSURE: 122 MMHG | WEIGHT: 167.2 LBS | RESPIRATION RATE: 16 BRPM | HEART RATE: 67 BPM | BODY MASS INDEX: 26.87 KG/M2 | TEMPERATURE: 96.8 F | OXYGEN SATURATION: 96 % | DIASTOLIC BLOOD PRESSURE: 68 MMHG

## 2025-06-05 DIAGNOSIS — E55.9 VITAMIN D DEFICIENCY: ICD-10-CM

## 2025-06-05 DIAGNOSIS — I10 ESSENTIAL (PRIMARY) HYPERTENSION: ICD-10-CM

## 2025-06-05 DIAGNOSIS — I48.91 ATRIAL FIBRILLATION, UNSPECIFIED TYPE (HCC): Primary | ICD-10-CM

## 2025-06-05 DIAGNOSIS — I48.0 PAROXYSMAL ATRIAL FIBRILLATION (HCC): ICD-10-CM

## 2025-06-05 DIAGNOSIS — I48.21 PERMANENT ATRIAL FIBRILLATION (HCC): ICD-10-CM

## 2025-06-05 DIAGNOSIS — E03.8 SUBCLINICAL HYPOTHYROIDISM: ICD-10-CM

## 2025-06-05 DIAGNOSIS — R73.01 IMPAIRED FASTING BLOOD SUGAR: ICD-10-CM

## 2025-06-05 ASSESSMENT — PATIENT HEALTH QUESTIONNAIRE - PHQ9
9. THOUGHTS THAT YOU WOULD BE BETTER OFF DEAD, OR OF HURTING YOURSELF: NOT AT ALL
6. FEELING BAD ABOUT YOURSELF - OR THAT YOU ARE A FAILURE OR HAVE LET YOURSELF OR YOUR FAMILY DOWN: NOT AT ALL
SUM OF ALL RESPONSES TO PHQ QUESTIONS 1-9: 0
4. FEELING TIRED OR HAVING LITTLE ENERGY: NOT AT ALL
1. LITTLE INTEREST OR PLEASURE IN DOING THINGS: NOT AT ALL
5. POOR APPETITE OR OVEREATING: NOT AT ALL
3. TROUBLE FALLING OR STAYING ASLEEP: NOT AT ALL
8. MOVING OR SPEAKING SO SLOWLY THAT OTHER PEOPLE COULD HAVE NOTICED. OR THE OPPOSITE, BEING SO FIGETY OR RESTLESS THAT YOU HAVE BEEN MOVING AROUND A LOT MORE THAN USUAL: NOT AT ALL
7. TROUBLE CONCENTRATING ON THINGS, SUCH AS READING THE NEWSPAPER OR WATCHING TELEVISION: NOT AT ALL
SUM OF ALL RESPONSES TO PHQ QUESTIONS 1-9: 0
SUM OF ALL RESPONSES TO PHQ QUESTIONS 1-9: 0
10. IF YOU CHECKED OFF ANY PROBLEMS, HOW DIFFICULT HAVE THESE PROBLEMS MADE IT FOR YOU TO DO YOUR WORK, TAKE CARE OF THINGS AT HOME, OR GET ALONG WITH OTHER PEOPLE: NOT DIFFICULT AT ALL
2. FEELING DOWN, DEPRESSED OR HOPELESS: NOT AT ALL
SUM OF ALL RESPONSES TO PHQ QUESTIONS 1-9: 0

## 2025-06-05 ASSESSMENT — LIFESTYLE VARIABLES
HOW OFTEN DO YOU HAVE A DRINK CONTAINING ALCOHOL: 2-3 TIMES A WEEK
HOW MANY STANDARD DRINKS CONTAINING ALCOHOL DO YOU HAVE ON A TYPICAL DAY: 1 OR 2

## 2025-06-05 NOTE — PROGRESS NOTES
Medicare Annual Wellness Visit    Tata Narvaez is here for Medicare AWV    Assessment & Plan   Atrial fibrillation, unspecified type (HCC)  Assessment & Plan:   Chronic, at goal (stable), continue current treatment plan.  Had ablation, following cardiology.  Orders:  -     CBC; Future  -     Comprehensive Metabolic Panel; Future  -     Lipid, Fasting; Future  -     TSH reflex to FT4; Future  -     Vitamin D 25 Hydroxy; Future  -     Hemoglobin A1C; Future  Permanent atrial fibrillation (HCC)  Assessment & Plan:   Chronic, at goal (stable), continue current treatment plan.  Had ablation, following cardiology.  Orders:  -     CBC; Future  -     Comprehensive Metabolic Panel; Future  -     Lipid, Fasting; Future  -     TSH reflex to FT4; Future  -     Vitamin D 25 Hydroxy; Future  -     Hemoglobin A1C; Future  Paroxysmal atrial fibrillation (HCC)  Assessment & Plan:   Chronic, at goal (stable), continue current treatment plan.  Had ablation, following cardiology.  Orders:  -     CBC; Future  -     Comprehensive Metabolic Panel; Future  -     Lipid, Fasting; Future  -     TSH reflex to FT4; Future  -     Vitamin D 25 Hydroxy; Future  -     Hemoglobin A1C; Future  Essential (primary) hypertension  Assessment & Plan:  Stable, controlled  No changes  Continue current treatment plan      Orders:  -     CBC; Future  -     Comprehensive Metabolic Panel; Future  -     Lipid, Fasting; Future  -     TSH reflex to FT4; Future  -     Vitamin D 25 Hydroxy; Future  -     Hemoglobin A1C; Future  Vitamin D deficiency  -     Vitamin D 25 Hydroxy; Future  Impaired fasting blood sugar  -     Hemoglobin A1C; Future  Subclinical hypothyroidism  Assessment & Plan:  Stable, controlled  No changes  Continue current treatment plan           No follow-ups on file.     Subjective   The following acute and/or chronic problems were also addressed today:  Shivam is here today for an annual wellness visit.  She is pleased with a 7 lb weight

## 2025-08-04 RX ORDER — LISINOPRIL AND HYDROCHLOROTHIAZIDE 10; 12.5 MG/1; MG/1
TABLET ORAL
Qty: 90 TABLET | Refills: 1 | Status: SHIPPED | OUTPATIENT
Start: 2025-08-04

## 2025-08-04 RX ORDER — CITALOPRAM HYDROBROMIDE 20 MG/1
TABLET ORAL
Qty: 135 TABLET | Refills: 1 | Status: SHIPPED | OUTPATIENT
Start: 2025-08-04

## 2025-08-11 ENCOUNTER — OFFICE VISIT (OUTPATIENT)
Dept: CARDIOLOGY CLINIC | Age: 75
End: 2025-08-11
Payer: MEDICARE

## 2025-08-11 ENCOUNTER — NURSE ONLY (OUTPATIENT)
Dept: CARDIOLOGY CLINIC | Age: 75
End: 2025-08-11

## 2025-08-11 VITALS
DIASTOLIC BLOOD PRESSURE: 82 MMHG | BODY MASS INDEX: 27.86 KG/M2 | OXYGEN SATURATION: 98 % | WEIGHT: 170 LBS | SYSTOLIC BLOOD PRESSURE: 118 MMHG | HEART RATE: 78 BPM

## 2025-08-11 DIAGNOSIS — I48.21 PERMANENT ATRIAL FIBRILLATION (HCC): Primary | ICD-10-CM

## 2025-08-11 DIAGNOSIS — Z79.01 ON CONTINUOUS ORAL ANTICOAGULATION: ICD-10-CM

## 2025-08-11 DIAGNOSIS — I48.91 ATRIAL FIBRILLATION, UNSPECIFIED TYPE (HCC): ICD-10-CM

## 2025-08-11 DIAGNOSIS — I10 HYPERTENSION, UNSPECIFIED TYPE: ICD-10-CM

## 2025-08-11 DIAGNOSIS — I10 ESSENTIAL (PRIMARY) HYPERTENSION: ICD-10-CM

## 2025-08-11 DIAGNOSIS — R00.2 PALPITATIONS: Primary | ICD-10-CM

## 2025-08-11 DIAGNOSIS — I42.9 CARDIOMYOPATHY, UNSPECIFIED TYPE (HCC): ICD-10-CM

## 2025-08-11 DIAGNOSIS — I48.21 PERMANENT ATRIAL FIBRILLATION (HCC): ICD-10-CM

## 2025-08-11 DIAGNOSIS — E55.9 VITAMIN D DEFICIENCY: ICD-10-CM

## 2025-08-11 DIAGNOSIS — I48.0 PAROXYSMAL ATRIAL FIBRILLATION (HCC): ICD-10-CM

## 2025-08-11 DIAGNOSIS — R73.01 IMPAIRED FASTING BLOOD SUGAR: ICD-10-CM

## 2025-08-11 PROCEDURE — 93000 ELECTROCARDIOGRAM COMPLETE: CPT | Performed by: NURSE PRACTITIONER

## 2025-08-11 PROCEDURE — 99214 OFFICE O/P EST MOD 30 MIN: CPT | Performed by: NURSE PRACTITIONER

## 2025-08-11 PROCEDURE — 3074F SYST BP LT 130 MM HG: CPT | Performed by: NURSE PRACTITIONER

## 2025-08-11 PROCEDURE — 3079F DIAST BP 80-89 MM HG: CPT | Performed by: NURSE PRACTITIONER

## 2025-08-11 PROCEDURE — 1123F ACP DISCUSS/DSCN MKR DOCD: CPT | Performed by: NURSE PRACTITIONER

## 2025-08-11 PROCEDURE — 1160F RVW MEDS BY RX/DR IN RCRD: CPT | Performed by: NURSE PRACTITIONER

## 2025-08-11 PROCEDURE — 1159F MED LIST DOCD IN RCRD: CPT | Performed by: NURSE PRACTITIONER

## 2025-08-12 ENCOUNTER — TELEPHONE (OUTPATIENT)
Dept: CARDIOLOGY CLINIC | Age: 75
End: 2025-08-12

## 2025-08-12 DIAGNOSIS — R55 SYNCOPE, UNSPECIFIED SYNCOPE TYPE: Primary | ICD-10-CM

## 2025-08-12 LAB
25(OH)D3 SERPL-MCNC: 45.5 NG/ML
ALBUMIN SERPL-MCNC: 4.5 G/DL (ref 3.4–5)
ALBUMIN/GLOB SERPL: 1.8 {RATIO} (ref 1.1–2.2)
ALP SERPL-CCNC: 62 U/L (ref 40–129)
ALT SERPL-CCNC: 18 U/L (ref 10–40)
ANION GAP SERPL CALCULATED.3IONS-SCNC: 10 MMOL/L (ref 3–16)
AST SERPL-CCNC: 25 U/L (ref 15–37)
BILIRUB SERPL-MCNC: 0.5 MG/DL (ref 0–1)
BUN SERPL-MCNC: 19 MG/DL (ref 7–20)
CALCIUM SERPL-MCNC: 9.2 MG/DL (ref 8.3–10.6)
CHLORIDE SERPL-SCNC: 98 MMOL/L (ref 99–110)
CHOLEST SERPL-MCNC: 187 MG/DL (ref 0–199)
CO2 SERPL-SCNC: 28 MMOL/L (ref 21–32)
CREAT SERPL-MCNC: 0.7 MG/DL (ref 0.6–1.2)
DEPRECATED RDW RBC AUTO: 12.8 % (ref 12.4–15.4)
EST. AVERAGE GLUCOSE BLD GHB EST-MCNC: 122.6 MG/DL
GFR SERPLBLD CREATININE-BSD FMLA CKD-EPI: 90 ML/MIN/{1.73_M2}
GLUCOSE SERPL-MCNC: 94 MG/DL (ref 70–99)
HBA1C MFR BLD: 5.9 %
HCT VFR BLD AUTO: 41.2 % (ref 36–48)
HDLC SERPL-MCNC: 51 MG/DL (ref 40–60)
HGB BLD-MCNC: 13.8 G/DL (ref 12–16)
LDL CHOLESTEROL: ABNORMAL MG/DL
LDLC SERPL-MCNC: 101 MG/DL
MCH RBC QN AUTO: 31.3 PG (ref 26–34)
MCHC RBC AUTO-ENTMCNC: 33.5 G/DL (ref 31–36)
MCV RBC AUTO: 93.5 FL (ref 80–100)
PLATELET # BLD AUTO: 305 K/UL (ref 135–450)
PMV BLD AUTO: 8.3 FL (ref 5–10.5)
POTASSIUM SERPL-SCNC: 3.9 MMOL/L (ref 3.5–5.1)
PROT SERPL-MCNC: 7 G/DL (ref 6.4–8.2)
RBC # BLD AUTO: 4.41 M/UL (ref 4–5.2)
SODIUM SERPL-SCNC: 136 MMOL/L (ref 136–145)
TRIGL SERPL-MCNC: 309 MG/DL (ref 0–150)
TSH SERPL DL<=0.005 MIU/L-ACNC: 2.86 UIU/ML (ref 0.27–4.2)
VLDLC SERPL CALC-MCNC: ABNORMAL MG/DL
WBC # BLD AUTO: 8.6 K/UL (ref 4–11)

## 2025-08-21 ENCOUNTER — TELEPHONE (OUTPATIENT)
Dept: CARDIOLOGY CLINIC | Age: 75
End: 2025-08-21

## 2025-09-02 ENCOUNTER — HOSPITAL ENCOUNTER (OUTPATIENT)
Age: 75
Discharge: HOME | End: 2025-09-04
Payer: MEDICARE

## 2025-09-02 VITALS
HEIGHT: 66 IN | SYSTOLIC BLOOD PRESSURE: 118 MMHG | BODY MASS INDEX: 27.32 KG/M2 | WEIGHT: 170 LBS | DIASTOLIC BLOOD PRESSURE: 82 MMHG

## 2025-09-02 DIAGNOSIS — I42.9 CARDIOMYOPATHY, UNSPECIFIED TYPE (HCC): ICD-10-CM

## 2025-09-02 DIAGNOSIS — I48.91 ATRIAL FIBRILLATION, UNSPECIFIED TYPE (HCC): ICD-10-CM

## 2025-09-02 LAB
ECHO AO ROOT DIAM: 2.9 CM
ECHO AO ROOT INDEX: 1.55 CM/M2
ECHO AV AREA PEAK VELOCITY: 2.3 CM2
ECHO AV AREA/BSA PEAK VELOCITY: 1.2 CM2/M2
ECHO AV PEAK GRADIENT: 12 MMHG
ECHO AV PEAK VELOCITY: 1.7 M/S
ECHO AV VELOCITY RATIO: 0.76
ECHO BSA: 1.89 M2
ECHO EST RA PRESSURE: 3 MMHG
ECHO IVC INSP: 0.1 CM
ECHO IVC PROX: 1.3 CM
ECHO LA AREA 2C: 31.9 CM2
ECHO LA AREA 4C: 31.8 CM2
ECHO LA MAJOR AXIS: 6.7 CM
ECHO LA MINOR AXIS: 7.6 CM
ECHO LA VOL BP: 120 ML (ref 22–52)
ECHO LA VOL MOD A2C: 110 ML (ref 22–52)
ECHO LA VOL MOD A4C: 117 ML (ref 22–52)
ECHO LA VOL/BSA BIPLANE: 64 ML/M2 (ref 16–34)
ECHO LA VOLUME INDEX MOD A2C: 59 ML/M2 (ref 16–34)
ECHO LA VOLUME INDEX MOD A4C: 63 ML/M2 (ref 16–34)
ECHO LV E' LATERAL VELOCITY: 11.5 CM/S
ECHO LV E' SEPTAL VELOCITY: 10.6 CM/S
ECHO LV EDV A2C: 111 ML
ECHO LV EDV A4C: 93 ML
ECHO LV EDV INDEX A4C: 50 ML/M2
ECHO LV EDV NDEX A2C: 59 ML/M2
ECHO LV EF PHYSICIAN: 63 %
ECHO LV EJECTION FRACTION A2C: 56 %
ECHO LV EJECTION FRACTION A4C: 66 %
ECHO LV EJECTION FRACTION BIPLANE: 62 % (ref 55–100)
ECHO LV ESV A2C: 49 ML
ECHO LV ESV A4C: 32 ML
ECHO LV ESV INDEX A2C: 26 ML/M2
ECHO LV ESV INDEX A4C: 17 ML/M2
ECHO LV FRACTIONAL SHORTENING: 24 % (ref 28–44)
ECHO LV INTERNAL DIMENSION DIASTOLE INDEX: 2.41 CM/M2
ECHO LV INTERNAL DIMENSION DIASTOLIC: 4.5 CM (ref 3.9–5.3)
ECHO LV INTERNAL DIMENSION SYSTOLIC INDEX: 1.82 CM/M2
ECHO LV INTERNAL DIMENSION SYSTOLIC: 3.4 CM
ECHO LV IVSD: 0.8 CM (ref 0.6–0.9)
ECHO LV MASS 2D: 113.6 G (ref 67–162)
ECHO LV MASS INDEX 2D: 60.8 G/M2 (ref 43–95)
ECHO LV POSTERIOR WALL DIASTOLIC: 0.8 CM (ref 0.6–0.9)
ECHO LV RELATIVE WALL THICKNESS RATIO: 0.36
ECHO LVOT AREA: 3.1 CM2
ECHO LVOT DIAM: 2 CM
ECHO LVOT MEAN GRADIENT: 3 MMHG
ECHO LVOT PEAK GRADIENT: 6 MMHG
ECHO LVOT PEAK VELOCITY: 1.3 M/S
ECHO LVOT STROKE VOLUME INDEX: 37.9 ML/M2
ECHO LVOT SV: 71 ML
ECHO LVOT VTI: 22.6 CM
ECHO PV MAX VELOCITY: 0.9 M/S
ECHO PV PEAK GRADIENT: 3 MMHG
ECHO RA AREA 4C: 21.9 CM2
ECHO RA END SYSTOLIC VOLUME APICAL 4 CHAMBER INDEX BSA: 28 ML/M2
ECHO RA VOLUME: 53 ML
ECHO RIGHT VENTRICULAR SYSTOLIC PRESSURE (RVSP): 30 MMHG
ECHO RV BASAL DIMENSION: 4.9 CM
ECHO RV FREE WALL PEAK S': 11.1 CM/S
ECHO RV LONGITUDINAL DIMENSION: 6 CM
ECHO RV MID DIMENSION: 2.9 CM
ECHO RV TAPSE: 1.7 CM (ref 1.7–?)
ECHO TV REGURGITANT MAX VELOCITY: 2.58 M/S
ECHO TV REGURGITANT PEAK GRADIENT: 27 MMHG

## 2025-09-02 PROCEDURE — 93306 TTE W/DOPPLER COMPLETE: CPT | Performed by: INTERNAL MEDICINE

## 2025-09-02 PROCEDURE — 6360000004 HC RX CONTRAST MEDICATION: Performed by: INTERNAL MEDICINE

## 2025-09-02 PROCEDURE — C8929 TTE W OR WO FOL WCON,DOPPLER: HCPCS

## 2025-09-02 RX ADMIN — SULFUR HEXAFLUORIDE 2 ML: 60.7; .19; .19 INJECTION, POWDER, LYOPHILIZED, FOR SUSPENSION INTRAVENOUS; INTRAVESICAL at 14:52

## (undated) DEVICE — SNARES COLD OVAL 10MM THIN

## (undated) DEVICE — TRAP SPEC RETRV CLR PLAS POLYP IN LN SUCT QUIK CTCH